# Patient Record
Sex: MALE | Race: BLACK OR AFRICAN AMERICAN | NOT HISPANIC OR LATINO | URBAN - METROPOLITAN AREA
[De-identification: names, ages, dates, MRNs, and addresses within clinical notes are randomized per-mention and may not be internally consistent; named-entity substitution may affect disease eponyms.]

---

## 2019-01-01 ENCOUNTER — INPATIENT (INPATIENT)
Facility: HOSPITAL | Age: 74
LOS: 2 days | Discharge: HOME HEALTH | End: 2019-12-31
Attending: HOSPITALIST | Admitting: HOSPITALIST
Payer: MEDICARE

## 2019-01-01 VITALS
DIASTOLIC BLOOD PRESSURE: 86 MMHG | RESPIRATION RATE: 18 BRPM | HEART RATE: 68 BPM | SYSTOLIC BLOOD PRESSURE: 133 MMHG | TEMPERATURE: 98 F | OXYGEN SATURATION: 96 %

## 2019-01-01 VITALS
RESPIRATION RATE: 24 BRPM | SYSTOLIC BLOOD PRESSURE: 125 MMHG | OXYGEN SATURATION: 100 % | DIASTOLIC BLOOD PRESSURE: 74 MMHG | HEART RATE: 76 BPM | TEMPERATURE: 99 F

## 2019-01-01 DIAGNOSIS — N17.9 ACUTE KIDNEY FAILURE, UNSPECIFIED: ICD-10-CM

## 2019-01-01 DIAGNOSIS — Z29.9 ENCOUNTER FOR PROPHYLACTIC MEASURES, UNSPECIFIED: ICD-10-CM

## 2019-01-01 DIAGNOSIS — E11.9 TYPE 2 DIABETES MELLITUS WITHOUT COMPLICATIONS: ICD-10-CM

## 2019-01-01 DIAGNOSIS — I10 ESSENTIAL (PRIMARY) HYPERTENSION: ICD-10-CM

## 2019-01-01 DIAGNOSIS — J44.9 CHRONIC OBSTRUCTIVE PULMONARY DISEASE, UNSPECIFIED: ICD-10-CM

## 2019-01-01 DIAGNOSIS — I25.10 ATHEROSCLEROTIC HEART DISEASE OF NATIVE CORONARY ARTERY WITHOUT ANGINA PECTORIS: ICD-10-CM

## 2019-01-01 DIAGNOSIS — N39.0 URINARY TRACT INFECTION, SITE NOT SPECIFIED: ICD-10-CM

## 2019-01-01 DIAGNOSIS — Z02.9 ENCOUNTER FOR ADMINISTRATIVE EXAMINATIONS, UNSPECIFIED: ICD-10-CM

## 2019-01-01 DIAGNOSIS — I50.21 ACUTE SYSTOLIC (CONGESTIVE) HEART FAILURE: ICD-10-CM

## 2019-01-01 DIAGNOSIS — J96.00 ACUTE RESPIRATORY FAILURE, UNSPECIFIED WHETHER WITH HYPOXIA OR HYPERCAPNIA: ICD-10-CM

## 2019-01-01 DIAGNOSIS — Z95.1 PRESENCE OF AORTOCORONARY BYPASS GRAFT: Chronic | ICD-10-CM

## 2019-01-01 LAB
ALBUMIN SERPL ELPH-MCNC: 3.9 G/DL — SIGNIFICANT CHANGE UP (ref 3.3–5)
ALBUMIN SERPL ELPH-MCNC: 3.9 G/DL — SIGNIFICANT CHANGE UP (ref 3.3–5)
ALP SERPL-CCNC: 103 U/L — SIGNIFICANT CHANGE UP (ref 40–120)
ALP SERPL-CCNC: 103 U/L — SIGNIFICANT CHANGE UP (ref 40–120)
ALT FLD-CCNC: 12 U/L — SIGNIFICANT CHANGE UP (ref 4–41)
ALT FLD-CCNC: 12 U/L — SIGNIFICANT CHANGE UP (ref 4–41)
ANION GAP SERPL CALC-SCNC: 12 MMO/L — SIGNIFICANT CHANGE UP (ref 7–14)
ANION GAP SERPL CALC-SCNC: 14 MMO/L — SIGNIFICANT CHANGE UP (ref 7–14)
ANION GAP SERPL CALC-SCNC: 14 MMO/L — SIGNIFICANT CHANGE UP (ref 7–14)
ANION GAP SERPL CALC-SCNC: 15 MMO/L — HIGH (ref 7–14)
APPEARANCE UR: SIGNIFICANT CHANGE UP
AST SERPL-CCNC: 11 U/L — SIGNIFICANT CHANGE UP (ref 4–40)
AST SERPL-CCNC: 11 U/L — SIGNIFICANT CHANGE UP (ref 4–40)
B PERT DNA SPEC QL NAA+PROBE: NOT DETECTED — SIGNIFICANT CHANGE UP
BACTERIA # UR AUTO: HIGH
BASE EXCESS BLDV CALC-SCNC: 3 MMOL/L — SIGNIFICANT CHANGE UP
BASOPHILS # BLD AUTO: 0.01 K/UL — SIGNIFICANT CHANGE UP (ref 0–0.2)
BASOPHILS # BLD AUTO: 0.01 K/UL — SIGNIFICANT CHANGE UP (ref 0–0.2)
BASOPHILS # BLD AUTO: 0.03 K/UL — SIGNIFICANT CHANGE UP (ref 0–0.2)
BASOPHILS # BLD AUTO: 0.04 K/UL — SIGNIFICANT CHANGE UP (ref 0–0.2)
BASOPHILS NFR BLD AUTO: 0.1 % — SIGNIFICANT CHANGE UP (ref 0–2)
BASOPHILS NFR BLD AUTO: 0.1 % — SIGNIFICANT CHANGE UP (ref 0–2)
BASOPHILS NFR BLD AUTO: 0.3 % — SIGNIFICANT CHANGE UP (ref 0–2)
BASOPHILS NFR BLD AUTO: 0.3 % — SIGNIFICANT CHANGE UP (ref 0–2)
BILIRUB SERPL-MCNC: 0.5 MG/DL — SIGNIFICANT CHANGE UP (ref 0.2–1.2)
BILIRUB SERPL-MCNC: 0.5 MG/DL — SIGNIFICANT CHANGE UP (ref 0.2–1.2)
BILIRUB UR-MCNC: NEGATIVE — SIGNIFICANT CHANGE UP
BLOOD GAS VENOUS - CREATININE: 2.66 MG/DL — HIGH (ref 0.5–1.3)
BLOOD GAS VENOUS - FIO2: 21 — SIGNIFICANT CHANGE UP
BLOOD UR QL VISUAL: HIGH
BUN SERPL-MCNC: 27 MG/DL — HIGH (ref 7–23)
BUN SERPL-MCNC: 30 MG/DL — HIGH (ref 7–23)
BUN SERPL-MCNC: 40 MG/DL — HIGH (ref 7–23)
BUN SERPL-MCNC: 45 MG/DL — HIGH (ref 7–23)
C PNEUM DNA SPEC QL NAA+PROBE: NOT DETECTED — SIGNIFICANT CHANGE UP
CALCIUM SERPL-MCNC: 10.1 MG/DL — SIGNIFICANT CHANGE UP (ref 8.4–10.5)
CALCIUM SERPL-MCNC: 10.1 MG/DL — SIGNIFICANT CHANGE UP (ref 8.4–10.5)
CALCIUM SERPL-MCNC: 9.3 MG/DL — SIGNIFICANT CHANGE UP (ref 8.4–10.5)
CALCIUM SERPL-MCNC: 9.9 MG/DL — SIGNIFICANT CHANGE UP (ref 8.4–10.5)
CHLORIDE BLDV-SCNC: 106 MMOL/L — SIGNIFICANT CHANGE UP (ref 96–108)
CHLORIDE SERPL-SCNC: 100 MMOL/L — SIGNIFICANT CHANGE UP (ref 98–107)
CHLORIDE SERPL-SCNC: 102 MMOL/L — SIGNIFICANT CHANGE UP (ref 98–107)
CHLORIDE UR-SCNC: 58 MMOL/L — SIGNIFICANT CHANGE UP
CHLORIDE UR-SCNC: < 20 MMOL/L — SIGNIFICANT CHANGE UP
CK MB BLD-MCNC: < 1 NG/ML — LOW (ref 1–6.6)
CK MB BLD-MCNC: SIGNIFICANT CHANGE UP (ref 0–2.5)
CK SERPL-CCNC: 74 U/L — SIGNIFICANT CHANGE UP (ref 30–200)
CO2 SERPL-SCNC: 21 MMOL/L — LOW (ref 22–31)
CO2 SERPL-SCNC: 22 MMOL/L — SIGNIFICANT CHANGE UP (ref 22–31)
CO2 SERPL-SCNC: 22 MMOL/L — SIGNIFICANT CHANGE UP (ref 22–31)
CO2 SERPL-SCNC: 23 MMOL/L — SIGNIFICANT CHANGE UP (ref 22–31)
COLOR SPEC: YELLOW — SIGNIFICANT CHANGE UP
CREAT ?TM UR-MCNC: 63 MG/DL — SIGNIFICANT CHANGE UP
CREAT SERPL-MCNC: 2.43 MG/DL — HIGH (ref 0.5–1.3)
CREAT SERPL-MCNC: 2.68 MG/DL — HIGH (ref 0.5–1.3)
CREAT SERPL-MCNC: 2.75 MG/DL — HIGH (ref 0.5–1.3)
CREAT SERPL-MCNC: 2.97 MG/DL — HIGH (ref 0.5–1.3)
EOSINOPHIL # BLD AUTO: 0 K/UL — SIGNIFICANT CHANGE UP (ref 0–0.5)
EOSINOPHIL # BLD AUTO: 0 K/UL — SIGNIFICANT CHANGE UP (ref 0–0.5)
EOSINOPHIL # BLD AUTO: 0.1 K/UL — SIGNIFICANT CHANGE UP (ref 0–0.5)
EOSINOPHIL # BLD AUTO: 0.14 K/UL — SIGNIFICANT CHANGE UP (ref 0–0.5)
EOSINOPHIL NFR BLD AUTO: 0 % — SIGNIFICANT CHANGE UP (ref 0–6)
EOSINOPHIL NFR BLD AUTO: 0 % — SIGNIFICANT CHANGE UP (ref 0–6)
EOSINOPHIL NFR BLD AUTO: 0.9 % — SIGNIFICANT CHANGE UP (ref 0–6)
EOSINOPHIL NFR BLD AUTO: 1.5 % — SIGNIFICANT CHANGE UP (ref 0–6)
FLUAV H1 2009 PAND RNA SPEC QL NAA+PROBE: NOT DETECTED — SIGNIFICANT CHANGE UP
FLUAV H1 RNA SPEC QL NAA+PROBE: NOT DETECTED — SIGNIFICANT CHANGE UP
FLUAV H3 RNA SPEC QL NAA+PROBE: NOT DETECTED — SIGNIFICANT CHANGE UP
FLUAV SUBTYP SPEC NAA+PROBE: NOT DETECTED — SIGNIFICANT CHANGE UP
FLUBV RNA SPEC QL NAA+PROBE: NOT DETECTED — SIGNIFICANT CHANGE UP
GAS PNL BLDV: 138 MMOL/L — SIGNIFICANT CHANGE UP (ref 136–146)
GLUCOSE BLDC GLUCOMTR-MCNC: 127 MG/DL — HIGH (ref 70–99)
GLUCOSE BLDC GLUCOMTR-MCNC: 142 MG/DL — HIGH (ref 70–99)
GLUCOSE BLDC GLUCOMTR-MCNC: 166 MG/DL — HIGH (ref 70–99)
GLUCOSE BLDC GLUCOMTR-MCNC: 174 MG/DL — HIGH (ref 70–99)
GLUCOSE BLDC GLUCOMTR-MCNC: 97 MG/DL — SIGNIFICANT CHANGE UP (ref 70–99)
GLUCOSE BLDV-MCNC: 143 MG/DL — HIGH (ref 70–99)
GLUCOSE SERPL-MCNC: 103 MG/DL — HIGH (ref 70–99)
GLUCOSE SERPL-MCNC: 122 MG/DL — HIGH (ref 70–99)
GLUCOSE SERPL-MCNC: 148 MG/DL — HIGH (ref 70–99)
GLUCOSE SERPL-MCNC: 178 MG/DL — HIGH (ref 70–99)
GLUCOSE UR-MCNC: NEGATIVE — SIGNIFICANT CHANGE UP
HADV DNA SPEC QL NAA+PROBE: NOT DETECTED — SIGNIFICANT CHANGE UP
HBA1C BLD-MCNC: 5.8 % — HIGH (ref 4–5.6)
HCO3 BLDV-SCNC: 25 MMOL/L — SIGNIFICANT CHANGE UP (ref 20–27)
HCOV PNL SPEC NAA+PROBE: SIGNIFICANT CHANGE UP
HCT VFR BLD CALC: 39.5 % — SIGNIFICANT CHANGE UP (ref 39–50)
HCT VFR BLD CALC: 40.4 % — SIGNIFICANT CHANGE UP (ref 39–50)
HCT VFR BLD CALC: 41.4 % — SIGNIFICANT CHANGE UP (ref 39–50)
HCT VFR BLD CALC: 42 % — SIGNIFICANT CHANGE UP (ref 39–50)
HCT VFR BLDV CALC: 45.2 % — SIGNIFICANT CHANGE UP (ref 39–51)
HCV AB S/CO SERPL IA: 0.22 S/CO — SIGNIFICANT CHANGE UP (ref 0–0.99)
HCV AB SERPL-IMP: SIGNIFICANT CHANGE UP
HGB BLD-MCNC: 12.9 G/DL — LOW (ref 13–17)
HGB BLD-MCNC: 13.3 G/DL — SIGNIFICANT CHANGE UP (ref 13–17)
HGB BLD-MCNC: 14.1 G/DL — SIGNIFICANT CHANGE UP (ref 13–17)
HGB BLD-MCNC: 14.2 G/DL — SIGNIFICANT CHANGE UP (ref 13–17)
HGB BLDV-MCNC: 14.7 G/DL — SIGNIFICANT CHANGE UP (ref 13–17)
HMPV RNA SPEC QL NAA+PROBE: NOT DETECTED — SIGNIFICANT CHANGE UP
HPIV1 RNA SPEC QL NAA+PROBE: NOT DETECTED — SIGNIFICANT CHANGE UP
HPIV2 RNA SPEC QL NAA+PROBE: NOT DETECTED — SIGNIFICANT CHANGE UP
HPIV3 RNA SPEC QL NAA+PROBE: NOT DETECTED — SIGNIFICANT CHANGE UP
HPIV4 RNA SPEC QL NAA+PROBE: NOT DETECTED — SIGNIFICANT CHANGE UP
IMM GRANULOCYTES NFR BLD AUTO: 0.3 % — SIGNIFICANT CHANGE UP (ref 0–1.5)
IMM GRANULOCYTES NFR BLD AUTO: 0.4 % — SIGNIFICANT CHANGE UP (ref 0–1.5)
KETONES UR-MCNC: NEGATIVE — SIGNIFICANT CHANGE UP
LACTATE BLDV-MCNC: 1.6 MMOL/L — SIGNIFICANT CHANGE UP (ref 0.5–2)
LEUKOCYTE ESTERASE UR-ACNC: SIGNIFICANT CHANGE UP
LYMPHOCYTES # BLD AUTO: 1.07 K/UL — SIGNIFICANT CHANGE UP (ref 1–3.3)
LYMPHOCYTES # BLD AUTO: 1.14 K/UL — SIGNIFICANT CHANGE UP (ref 1–3.3)
LYMPHOCYTES # BLD AUTO: 1.55 K/UL — SIGNIFICANT CHANGE UP (ref 1–3.3)
LYMPHOCYTES # BLD AUTO: 12.1 % — LOW (ref 13–44)
LYMPHOCYTES # BLD AUTO: 2.57 K/UL — SIGNIFICANT CHANGE UP (ref 1–3.3)
LYMPHOCYTES # BLD AUTO: 28.1 % — SIGNIFICANT CHANGE UP (ref 13–44)
LYMPHOCYTES # BLD AUTO: 9.7 % — LOW (ref 13–44)
LYMPHOCYTES # BLD AUTO: 9.8 % — LOW (ref 13–44)
MAGNESIUM SERPL-MCNC: 2.1 MG/DL — SIGNIFICANT CHANGE UP (ref 1.6–2.6)
MAGNESIUM SERPL-MCNC: 2.2 MG/DL — SIGNIFICANT CHANGE UP (ref 1.6–2.6)
MAGNESIUM SERPL-MCNC: 2.2 MG/DL — SIGNIFICANT CHANGE UP (ref 1.6–2.6)
MCHC RBC-ENTMCNC: 29.9 PG — SIGNIFICANT CHANGE UP (ref 27–34)
MCHC RBC-ENTMCNC: 30.9 PG — SIGNIFICANT CHANGE UP (ref 27–34)
MCHC RBC-ENTMCNC: 31.1 PG — SIGNIFICANT CHANGE UP (ref 27–34)
MCHC RBC-ENTMCNC: 31.2 PG — SIGNIFICANT CHANGE UP (ref 27–34)
MCHC RBC-ENTMCNC: 32.7 % — SIGNIFICANT CHANGE UP (ref 32–36)
MCHC RBC-ENTMCNC: 32.9 % — SIGNIFICANT CHANGE UP (ref 32–36)
MCHC RBC-ENTMCNC: 33.8 % — SIGNIFICANT CHANGE UP (ref 32–36)
MCHC RBC-ENTMCNC: 34.1 % — SIGNIFICANT CHANGE UP (ref 32–36)
MCV RBC AUTO: 91.4 FL — SIGNIFICANT CHANGE UP (ref 80–100)
MCV RBC AUTO: 91.6 FL — SIGNIFICANT CHANGE UP (ref 80–100)
MCV RBC AUTO: 92.1 FL — SIGNIFICANT CHANGE UP (ref 80–100)
MCV RBC AUTO: 94 FL — SIGNIFICANT CHANGE UP (ref 80–100)
MONOCYTES # BLD AUTO: 0.19 K/UL — SIGNIFICANT CHANGE UP (ref 0–0.9)
MONOCYTES # BLD AUTO: 0.44 K/UL — SIGNIFICANT CHANGE UP (ref 0–0.9)
MONOCYTES # BLD AUTO: 0.72 K/UL — SIGNIFICANT CHANGE UP (ref 0–0.9)
MONOCYTES # BLD AUTO: 0.79 K/UL — SIGNIFICANT CHANGE UP (ref 0–0.9)
MONOCYTES NFR BLD AUTO: 1.7 % — LOW (ref 2–14)
MONOCYTES NFR BLD AUTO: 3.8 % — SIGNIFICANT CHANGE UP (ref 2–14)
MONOCYTES NFR BLD AUTO: 6.2 % — SIGNIFICANT CHANGE UP (ref 2–14)
MONOCYTES NFR BLD AUTO: 7.9 % — SIGNIFICANT CHANGE UP (ref 2–14)
MUCOUS THREADS # UR AUTO: SIGNIFICANT CHANGE UP
NEUTROPHILS # BLD AUTO: 10.44 K/UL — HIGH (ref 1.8–7.4)
NEUTROPHILS # BLD AUTO: 5.64 K/UL — SIGNIFICANT CHANGE UP (ref 1.8–7.4)
NEUTROPHILS # BLD AUTO: 9.71 K/UL — HIGH (ref 1.8–7.4)
NEUTROPHILS # BLD AUTO: 9.83 K/UL — HIGH (ref 1.8–7.4)
NEUTROPHILS NFR BLD AUTO: 61.8 % — SIGNIFICANT CHANGE UP (ref 43–77)
NEUTROPHILS NFR BLD AUTO: 81.2 % — HIGH (ref 43–77)
NEUTROPHILS NFR BLD AUTO: 84.9 % — HIGH (ref 43–77)
NEUTROPHILS NFR BLD AUTO: 88.1 % — HIGH (ref 43–77)
NITRITE UR-MCNC: NEGATIVE — SIGNIFICANT CHANGE UP
NRBC # FLD: 0 K/UL — SIGNIFICANT CHANGE UP (ref 0–0)
NT-PROBNP SERPL-SCNC: 127.5 PG/ML — SIGNIFICANT CHANGE UP
OSMOLALITY UR: 439 MOSMO/KG — SIGNIFICANT CHANGE UP (ref 50–1200)
PCO2 BLDV: 50 MMHG — SIGNIFICANT CHANGE UP (ref 41–51)
PH BLDV: 7.37 PH — SIGNIFICANT CHANGE UP (ref 7.32–7.43)
PH UR: 7 — SIGNIFICANT CHANGE UP (ref 5–8)
PHOSPHATE SERPL-MCNC: 1.1 MG/DL — LOW (ref 2.5–4.5)
PHOSPHATE SERPL-MCNC: 3.2 MG/DL — SIGNIFICANT CHANGE UP (ref 2.5–4.5)
PHOSPHATE SERPL-MCNC: 3.2 MG/DL — SIGNIFICANT CHANGE UP (ref 2.5–4.5)
PLATELET # BLD AUTO: 236 K/UL — SIGNIFICANT CHANGE UP (ref 150–400)
PLATELET # BLD AUTO: 259 K/UL — SIGNIFICANT CHANGE UP (ref 150–400)
PLATELET # BLD AUTO: 264 K/UL — SIGNIFICANT CHANGE UP (ref 150–400)
PLATELET # BLD AUTO: 287 K/UL — SIGNIFICANT CHANGE UP (ref 150–400)
PMV BLD: 10.7 FL — SIGNIFICANT CHANGE UP (ref 7–13)
PMV BLD: 10.8 FL — SIGNIFICANT CHANGE UP (ref 7–13)
PMV BLD: 11.2 FL — SIGNIFICANT CHANGE UP (ref 7–13)
PMV BLD: 9.9 FL — SIGNIFICANT CHANGE UP (ref 7–13)
PO2 BLDV: 29 MMHG — LOW (ref 35–40)
POTASSIUM BLDV-SCNC: 3.5 MMOL/L — SIGNIFICANT CHANGE UP (ref 3.4–4.5)
POTASSIUM SERPL-MCNC: 3.2 MMOL/L — LOW (ref 3.5–5.3)
POTASSIUM SERPL-MCNC: 3.4 MMOL/L — LOW (ref 3.5–5.3)
POTASSIUM SERPL-MCNC: 3.4 MMOL/L — LOW (ref 3.5–5.3)
POTASSIUM SERPL-MCNC: 3.8 MMOL/L — SIGNIFICANT CHANGE UP (ref 3.5–5.3)
POTASSIUM SERPL-SCNC: 3.2 MMOL/L — LOW (ref 3.5–5.3)
POTASSIUM SERPL-SCNC: 3.4 MMOL/L — LOW (ref 3.5–5.3)
POTASSIUM SERPL-SCNC: 3.4 MMOL/L — LOW (ref 3.5–5.3)
POTASSIUM SERPL-SCNC: 3.8 MMOL/L — SIGNIFICANT CHANGE UP (ref 3.5–5.3)
POTASSIUM UR-SCNC: 12 MMOL/L — SIGNIFICANT CHANGE UP
POTASSIUM UR-SCNC: 66.9 MMOL/L — SIGNIFICANT CHANGE UP
PROT SERPL-MCNC: 8 G/DL — SIGNIFICANT CHANGE UP (ref 6–8.3)
PROT SERPL-MCNC: 8.2 G/DL — SIGNIFICANT CHANGE UP (ref 6–8.3)
PROT UR-MCNC: 50 — SIGNIFICANT CHANGE UP
RBC # BLD: 4.3 M/UL — SIGNIFICANT CHANGE UP (ref 4.2–5.8)
RBC # BLD: 4.32 M/UL — SIGNIFICANT CHANGE UP (ref 4.2–5.8)
RBC # BLD: 4.52 M/UL — SIGNIFICANT CHANGE UP (ref 4.2–5.8)
RBC # BLD: 4.56 M/UL — SIGNIFICANT CHANGE UP (ref 4.2–5.8)
RBC # FLD: 16.5 % — HIGH (ref 10.3–14.5)
RBC # FLD: 16.5 % — HIGH (ref 10.3–14.5)
RBC # FLD: 16.8 % — HIGH (ref 10.3–14.5)
RBC # FLD: 17.1 % — HIGH (ref 10.3–14.5)
RBC CASTS # UR COMP ASSIST: SIGNIFICANT CHANGE UP (ref 0–?)
RSV RNA SPEC QL NAA+PROBE: NOT DETECTED — SIGNIFICANT CHANGE UP
RV+EV RNA SPEC QL NAA+PROBE: NOT DETECTED — SIGNIFICANT CHANGE UP
SAO2 % BLDV: 49.2 % — LOW (ref 60–85)
SODIUM SERPL-SCNC: 135 MMOL/L — SIGNIFICANT CHANGE UP (ref 135–145)
SODIUM SERPL-SCNC: 137 MMOL/L — SIGNIFICANT CHANGE UP (ref 135–145)
SODIUM SERPL-SCNC: 138 MMOL/L — SIGNIFICANT CHANGE UP (ref 135–145)
SODIUM SERPL-SCNC: 139 MMOL/L — SIGNIFICANT CHANGE UP (ref 135–145)
SODIUM UR-SCNC: 85 MMOL/L — SIGNIFICANT CHANGE UP
SODIUM UR-SCNC: SIGNIFICANT CHANGE UP MMOL/L
SP GR SPEC: 1.02 — SIGNIFICANT CHANGE UP (ref 1–1.04)
TROPONIN T, HIGH SENSITIVITY: 15 NG/L — SIGNIFICANT CHANGE UP (ref ?–14)
TROPONIN T, HIGH SENSITIVITY: 19 NG/L — SIGNIFICANT CHANGE UP (ref ?–14)
UROBILINOGEN FLD QL: NORMAL — SIGNIFICANT CHANGE UP
UUN UR-MCNC: 579.2 MG/DL — SIGNIFICANT CHANGE UP
UUN UR-MCNC: 607 MG/DL — SIGNIFICANT CHANGE UP
WBC # BLD: 11.02 K/UL — HIGH (ref 3.8–10.5)
WBC # BLD: 11.59 K/UL — HIGH (ref 3.8–10.5)
WBC # BLD: 12.84 K/UL — HIGH (ref 3.8–10.5)
WBC # BLD: 9.14 K/UL — SIGNIFICANT CHANGE UP (ref 3.8–10.5)
WBC # FLD AUTO: 11.02 K/UL — HIGH (ref 3.8–10.5)
WBC # FLD AUTO: 11.59 K/UL — HIGH (ref 3.8–10.5)
WBC # FLD AUTO: 12.84 K/UL — HIGH (ref 3.8–10.5)
WBC # FLD AUTO: 9.14 K/UL — SIGNIFICANT CHANGE UP (ref 3.8–10.5)
WBC UR QL: SIGNIFICANT CHANGE UP (ref 0–?)

## 2019-01-01 PROCEDURE — 99232 SBSQ HOSP IP/OBS MODERATE 35: CPT | Mod: GC

## 2019-01-01 PROCEDURE — 99233 SBSQ HOSP IP/OBS HIGH 50: CPT

## 2019-01-01 PROCEDURE — 99223 1ST HOSP IP/OBS HIGH 75: CPT

## 2019-01-01 PROCEDURE — 71250 CT THORAX DX C-: CPT | Mod: 26

## 2019-01-01 PROCEDURE — 71045 X-RAY EXAM CHEST 1 VIEW: CPT | Mod: 26

## 2019-01-01 PROCEDURE — 76770 US EXAM ABDO BACK WALL COMP: CPT | Mod: 26

## 2019-01-01 PROCEDURE — 99239 HOSP IP/OBS DSCHRG MGMT >30: CPT

## 2019-01-01 RX ORDER — NIFEDIPINE 30 MG
1 TABLET, EXTENDED RELEASE 24 HR ORAL
Qty: 0 | Refills: 0 | DISCHARGE

## 2019-01-01 RX ORDER — RANOLAZINE 500 MG/1
1000 TABLET, FILM COATED, EXTENDED RELEASE ORAL
Refills: 0 | Status: DISCONTINUED | OUTPATIENT
Start: 2019-01-01 | End: 2019-01-01

## 2019-01-01 RX ORDER — RANOLAZINE 500 MG/1
1 TABLET, FILM COATED, EXTENDED RELEASE ORAL
Qty: 0 | Refills: 0 | DISCHARGE

## 2019-01-01 RX ORDER — CEFTRIAXONE 500 MG/1
1000 INJECTION, POWDER, FOR SOLUTION INTRAMUSCULAR; INTRAVENOUS EVERY 24 HOURS
Refills: 0 | Status: DISCONTINUED | OUTPATIENT
Start: 2019-01-01 | End: 2019-01-01

## 2019-01-01 RX ORDER — HEPARIN SODIUM 5000 [USP'U]/ML
5000 INJECTION INTRAVENOUS; SUBCUTANEOUS EVERY 8 HOURS
Refills: 0 | Status: DISCONTINUED | OUTPATIENT
Start: 2019-01-01 | End: 2019-01-01

## 2019-01-01 RX ORDER — BUDESONIDE AND FORMOTEROL FUMARATE DIHYDRATE 160; 4.5 UG/1; UG/1
2 AEROSOL RESPIRATORY (INHALATION)
Qty: 0 | Refills: 0 | DISCHARGE

## 2019-01-01 RX ORDER — AZITHROMYCIN 500 MG/1
500 TABLET, FILM COATED ORAL ONCE
Refills: 0 | Status: COMPLETED | OUTPATIENT
Start: 2019-01-01 | End: 2019-01-01

## 2019-01-01 RX ORDER — BUMETANIDE 0.25 MG/ML
1 INJECTION INTRAMUSCULAR; INTRAVENOUS
Qty: 0 | Refills: 0 | DISCHARGE

## 2019-01-01 RX ORDER — DEXTROSE 50 % IN WATER 50 %
12.5 SYRINGE (ML) INTRAVENOUS ONCE
Refills: 0 | Status: DISCONTINUED | OUTPATIENT
Start: 2019-01-01 | End: 2019-01-01

## 2019-01-01 RX ORDER — IPRATROPIUM/ALBUTEROL SULFATE 18-103MCG
3 AEROSOL WITH ADAPTER (GRAM) INHALATION ONCE
Refills: 0 | Status: COMPLETED | OUTPATIENT
Start: 2019-01-01 | End: 2019-01-01

## 2019-01-01 RX ORDER — GLUCAGON INJECTION, SOLUTION 0.5 MG/.1ML
1 INJECTION, SOLUTION SUBCUTANEOUS ONCE
Refills: 0 | Status: DISCONTINUED | OUTPATIENT
Start: 2019-01-01 | End: 2019-01-01

## 2019-01-01 RX ORDER — ATORVASTATIN CALCIUM 80 MG/1
80 TABLET, FILM COATED ORAL AT BEDTIME
Refills: 0 | Status: DISCONTINUED | OUTPATIENT
Start: 2019-01-01 | End: 2019-01-01

## 2019-01-01 RX ORDER — INSULIN LISPRO 100/ML
VIAL (ML) SUBCUTANEOUS AT BEDTIME
Refills: 0 | Status: DISCONTINUED | OUTPATIENT
Start: 2019-01-01 | End: 2019-01-01

## 2019-01-01 RX ORDER — HYDRALAZINE HCL 50 MG
1 TABLET ORAL
Qty: 0 | Refills: 0 | DISCHARGE

## 2019-01-01 RX ORDER — SODIUM CHLORIDE 9 MG/ML
1000 INJECTION INTRAMUSCULAR; INTRAVENOUS; SUBCUTANEOUS
Refills: 0 | Status: DISCONTINUED | OUTPATIENT
Start: 2019-01-01 | End: 2019-01-01

## 2019-01-01 RX ORDER — IPRATROPIUM/ALBUTEROL SULFATE 18-103MCG
3 AEROSOL WITH ADAPTER (GRAM) INHALATION EVERY 6 HOURS
Refills: 0 | Status: DISCONTINUED | OUTPATIENT
Start: 2019-01-01 | End: 2019-01-01

## 2019-01-01 RX ORDER — SODIUM CHLORIDE 9 MG/ML
1000 INJECTION, SOLUTION INTRAVENOUS
Refills: 0 | Status: DISCONTINUED | OUTPATIENT
Start: 2019-01-01 | End: 2019-01-01

## 2019-01-01 RX ORDER — CARVEDILOL PHOSPHATE 80 MG/1
25 CAPSULE, EXTENDED RELEASE ORAL EVERY 12 HOURS
Refills: 0 | Status: DISCONTINUED | OUTPATIENT
Start: 2019-01-01 | End: 2019-01-01

## 2019-01-01 RX ORDER — DEXTROSE 50 % IN WATER 50 %
25 SYRINGE (ML) INTRAVENOUS ONCE
Refills: 0 | Status: DISCONTINUED | OUTPATIENT
Start: 2019-01-01 | End: 2019-01-01

## 2019-01-01 RX ORDER — SITAGLIPTIN 50 MG/1
1 TABLET, FILM COATED ORAL
Qty: 0 | Refills: 0 | DISCHARGE

## 2019-01-01 RX ORDER — CARVEDILOL PHOSPHATE 80 MG/1
1 CAPSULE, EXTENDED RELEASE ORAL
Qty: 0 | Refills: 0 | DISCHARGE

## 2019-01-01 RX ORDER — INSULIN LISPRO 100/ML
VIAL (ML) SUBCUTANEOUS
Refills: 0 | Status: DISCONTINUED | OUTPATIENT
Start: 2019-01-01 | End: 2019-01-01

## 2019-01-01 RX ORDER — ATORVASTATIN CALCIUM 80 MG/1
1 TABLET, FILM COATED ORAL
Qty: 0 | Refills: 0 | DISCHARGE

## 2019-01-01 RX ORDER — NIFEDIPINE 30 MG
60 TABLET, EXTENDED RELEASE 24 HR ORAL DAILY
Refills: 0 | Status: DISCONTINUED | OUTPATIENT
Start: 2019-01-01 | End: 2019-01-01

## 2019-01-01 RX ORDER — DEXTROSE 50 % IN WATER 50 %
15 SYRINGE (ML) INTRAVENOUS ONCE
Refills: 0 | Status: DISCONTINUED | OUTPATIENT
Start: 2019-01-01 | End: 2019-01-01

## 2019-01-01 RX ORDER — SENNA PLUS 8.6 MG/1
1 TABLET ORAL
Qty: 0 | Refills: 0 | DISCHARGE

## 2019-01-01 RX ORDER — POTASSIUM PHOSPHATE, MONOBASIC POTASSIUM PHOSPHATE, DIBASIC 236; 224 MG/ML; MG/ML
15 INJECTION, SOLUTION INTRAVENOUS ONCE
Refills: 0 | Status: COMPLETED | OUTPATIENT
Start: 2019-01-01 | End: 2019-01-01

## 2019-01-01 RX ADMIN — AZITHROMYCIN 500 MILLIGRAM(S): 500 TABLET, FILM COATED ORAL at 21:20

## 2019-01-01 RX ADMIN — CARVEDILOL PHOSPHATE 25 MILLIGRAM(S): 80 CAPSULE, EXTENDED RELEASE ORAL at 05:03

## 2019-01-01 RX ADMIN — CEFTRIAXONE 100 MILLIGRAM(S): 500 INJECTION, POWDER, FOR SOLUTION INTRAMUSCULAR; INTRAVENOUS at 14:16

## 2019-01-01 RX ADMIN — HEPARIN SODIUM 5000 UNIT(S): 5000 INJECTION INTRAVENOUS; SUBCUTANEOUS at 14:12

## 2019-01-01 RX ADMIN — CARVEDILOL PHOSPHATE 25 MILLIGRAM(S): 80 CAPSULE, EXTENDED RELEASE ORAL at 17:37

## 2019-01-01 RX ADMIN — HEPARIN SODIUM 5000 UNIT(S): 5000 INJECTION INTRAVENOUS; SUBCUTANEOUS at 21:54

## 2019-01-01 RX ADMIN — Medication 3 MILLILITER(S): at 21:50

## 2019-01-01 RX ADMIN — Medication 3 MILLILITER(S): at 15:40

## 2019-01-01 RX ADMIN — Medication 3 MILLILITER(S): at 09:37

## 2019-01-01 RX ADMIN — Medication 1: at 17:37

## 2019-01-01 RX ADMIN — Medication 60 MILLIGRAM(S): at 05:03

## 2019-01-01 RX ADMIN — CEFTRIAXONE 100 MILLIGRAM(S): 500 INJECTION, POWDER, FOR SOLUTION INTRAMUSCULAR; INTRAVENOUS at 13:35

## 2019-01-01 RX ADMIN — Medication 3 MILLILITER(S): at 00:20

## 2019-01-01 RX ADMIN — HEPARIN SODIUM 5000 UNIT(S): 5000 INJECTION INTRAVENOUS; SUBCUTANEOUS at 05:26

## 2019-01-01 RX ADMIN — RANOLAZINE 1000 MILLIGRAM(S): 500 TABLET, FILM COATED, EXTENDED RELEASE ORAL at 05:22

## 2019-01-01 RX ADMIN — RANOLAZINE 1000 MILLIGRAM(S): 500 TABLET, FILM COATED, EXTENDED RELEASE ORAL at 17:25

## 2019-01-01 RX ADMIN — Medication 60 MILLIGRAM(S): at 05:21

## 2019-01-01 RX ADMIN — ATORVASTATIN CALCIUM 80 MILLIGRAM(S): 80 TABLET, FILM COATED ORAL at 21:54

## 2019-01-01 RX ADMIN — HEPARIN SODIUM 5000 UNIT(S): 5000 INJECTION INTRAVENOUS; SUBCUTANEOUS at 13:19

## 2019-01-01 RX ADMIN — Medication 3 MILLILITER(S): at 16:15

## 2019-01-01 RX ADMIN — ATORVASTATIN CALCIUM 80 MILLIGRAM(S): 80 TABLET, FILM COATED ORAL at 22:15

## 2019-01-01 RX ADMIN — CARVEDILOL PHOSPHATE 25 MILLIGRAM(S): 80 CAPSULE, EXTENDED RELEASE ORAL at 17:25

## 2019-01-01 RX ADMIN — Medication 3 MILLILITER(S): at 10:00

## 2019-01-01 RX ADMIN — HEPARIN SODIUM 5000 UNIT(S): 5000 INJECTION INTRAVENOUS; SUBCUTANEOUS at 05:22

## 2019-01-01 RX ADMIN — CARVEDILOL PHOSPHATE 25 MILLIGRAM(S): 80 CAPSULE, EXTENDED RELEASE ORAL at 05:22

## 2019-01-01 RX ADMIN — HEPARIN SODIUM 5000 UNIT(S): 5000 INJECTION INTRAVENOUS; SUBCUTANEOUS at 22:15

## 2019-01-01 RX ADMIN — SODIUM CHLORIDE 100 MILLILITER(S): 9 INJECTION INTRAMUSCULAR; INTRAVENOUS; SUBCUTANEOUS at 10:08

## 2019-01-01 RX ADMIN — Medication 1: at 12:12

## 2019-01-01 RX ADMIN — Medication 3 MILLILITER(S): at 20:24

## 2019-01-01 RX ADMIN — RANOLAZINE 1000 MILLIGRAM(S): 500 TABLET, FILM COATED, EXTENDED RELEASE ORAL at 05:03

## 2019-01-01 RX ADMIN — SODIUM CHLORIDE 100 MILLILITER(S): 9 INJECTION INTRAMUSCULAR; INTRAVENOUS; SUBCUTANEOUS at 17:25

## 2019-01-01 RX ADMIN — Medication 3 MILLILITER(S): at 10:47

## 2019-01-01 RX ADMIN — Medication 3 MILLILITER(S): at 05:00

## 2019-01-01 RX ADMIN — AZITHROMYCIN 255 MILLIGRAM(S): 500 TABLET, FILM COATED ORAL at 20:24

## 2019-01-01 RX ADMIN — POTASSIUM PHOSPHATE, MONOBASIC POTASSIUM PHOSPHATE, DIBASIC 62.5 MILLIMOLE(S): 236; 224 INJECTION, SOLUTION INTRAVENOUS at 09:51

## 2019-01-01 RX ADMIN — HEPARIN SODIUM 5000 UNIT(S): 5000 INJECTION INTRAVENOUS; SUBCUTANEOUS at 05:03

## 2019-12-28 NOTE — ED PROVIDER NOTE - ATTENDING CONTRIBUTION TO CARE
Attending note:   After face to face evaluation of this patient, I concur with above noted hx, pe, and care plan for this patient.  73 y/o M with dm, htn, copd wheezing today with treatment by ems with two duonebs and decadron, here more comfortable, some wheeze present, no rales.   +Flu vaccinated; no fever.

## 2019-12-28 NOTE — ED PROVIDER NOTE - CLINICAL SUMMARY MEDICAL DECISION MAKING FREE TEXT BOX
74M w/ COPD exacerbation, received treatment via EMS, will check labs, cxr, vbg, azithromycin, treatment, reassess

## 2019-12-28 NOTE — ED PROVIDER NOTE - PHYSICAL EXAMINATION
CONSTITUTIONAL: NAD, awake, alert  HEAD: Normocephalic; atraumatic  ENMT: External appears normal, MMM  CARD: Normal Sl, S2; no audible murmurs  RESP: normal wob, wheezes throughout, sating 94%  ABD: soft, non-distended; non-tender  MSK: no edema, normal ROM in all four extremities  SKIN: Warm, dry, no rashes  NEURO: aaox3, moving all extremities spontaneously

## 2019-12-28 NOTE — ED PROVIDER NOTE - OBJECTIVE STATEMENT
74M w/ COPD, HTN, DM, HLD p/w 1 day of SOB. Daughter at bedside states patient has multiple sick viral contacts at home. Patient denies fevers, chills, n/v. States he has been having some chest pain for a long time with walking. No chest pain today. Reports normal echo and stress test 2 weeks ago. EMS provided patient w/ 12mg of dex, 2 treatments. Daughter states patient has poor f/u and requesting referrals

## 2019-12-28 NOTE — ED PROVIDER NOTE - PROGRESS NOTE DETAILS
Ramirez: discussed w/ daughter about renal function results, states they do not know of any h/o renal disease, states patient hasn't had labs in some time, will admit for new onset jayson vs ckd Ramirez: bedside sono reveals small bladder, pro BNP negative

## 2019-12-28 NOTE — ED PROVIDER NOTE - NS ED ROS FT
General: denies fever, chills  HENT:  + nasal congestion, + rhinorrhea  Eyes: denies visual changes, blurred vision  CV: + chest pain, denies palpitations  Resp: + difficulty breathing, denies cough  Abdominal: denies nausea, vomiting, abdominal pain  MSK: denies muscle aches, leg swelling  Neuro: denies headaches, numbness, tingling  Skin: denies rashes, bruises

## 2019-12-28 NOTE — ED ADULT NURSE REASSESSMENT NOTE - NS ED NURSE REASSESS COMMENT FT1
Patient resting comfortably, reports feeling overall symptomatic improvement while in ED.  Denies shortness of breath, chest pain, or other complaints at this time.  Vitally stable at this time. Evening Blood glucose checked.  Patient and family advised to plan of care, RVP resulted as negative, isolation precautions discontinued, pending report given to admission bed assignment.  Patient accepting to plan of care.

## 2019-12-28 NOTE — ED PROVIDER NOTE - CARE PLAN
Principal Discharge DX:	JOHN (acute kidney injury)  Secondary Diagnosis:	COPD (chronic obstructive pulmonary disease)

## 2019-12-28 NOTE — ED ADULT TRIAGE NOTE - CHIEF COMPLAINT QUOTE
Pt SOB and cough today, audible wheezing noted.  int he field. Combivent treatment x 2  and IV 12mg decadron 12Mg given by EMS via20G left hand PMH copd, CVA, DM, htn

## 2019-12-28 NOTE — ED ADULT NURSE NOTE - OBJECTIVE STATEMENT
Receive pt. in room 19 alert and oriented x 4 presenting to the ER with complaints of cough, wheezing and shortness of breath. Pt. have a history of Asthma, DM, CVA, HTN, COPD. Pt.tated " this afternoon I have a cough and I started wheezing and sometimes I have shprtness of breath. Medicated as ordered, labs sent .

## 2019-12-28 NOTE — ED ADULT NURSE REASSESSMENT NOTE - NS ED NURSE REASSESS COMMENT FT1
Received pt. from pranav RN. RVP sent. Pt. and family at bedside given education regarding droplet precautions. Pt. placed on pulse ox, pt. oxygen saturation at 96%. Pt. denies any pain. Awaiting further orders. Will continue to monitor.

## 2019-12-29 NOTE — H&P ADULT - PROBLEM SELECTOR PLAN 1
- Patient p/w resp distress. S/p steroids and Duonebs. Now with significant symptomatic improvement.  - Unclear if GALEANO is 2/2 COPD exacerbation vs. PNA vs. CHF exacerbation  - Will continue to monitor sx. Will trend VBG if worsening status.

## 2019-12-29 NOTE — H&P ADULT - NSICDXPASTMEDICALHX_GEN_ALL_CORE_FT
PAST MEDICAL HISTORY:  COPD (chronic obstructive pulmonary disease)     DM (diabetes mellitus)     HLD (hyperlipidemia)     HTN (hypertension)

## 2019-12-29 NOTE — H&P ADULT - NSHPLABSRESULTS_GEN_ALL_CORE
(12-28 @ 20:15)                      14.1  11.59 )-----------( 236                 41.4    Neutrophils = 9.83 (84.9%)  Lymphocytes = 1.14 (9.8%)  Eosinophils = 0.10 (0.9%)  Basophils = 0.04 (0.3%)  Monocytes = 0.44 (3.8%)  Bands = --%    12-28    139  |  102  |  27<H>  ----------------------------<  148<H>  3.8   |  22  |  2.68<H>    Ca    10.1      28 Dec 2019 20:15    TPro  8.0  /  Alb  3.9  /  TBili  0.5  /  DBili  x   /  AST  11  /  ALT  12  /  AlkPhos  103  12-28    CARDIAC MARKERS ( 28 Dec 2019 20:15 )  Trop x     / CK 74 u/L / CKMB < 1.00 ng/mL<L>     Venous Blood Gas:  12-28 @ 20:15  7.37/50/29/25/49.2  VBG Lactate: 1.6    EKG: no acute ST changes

## 2019-12-29 NOTE — H&P ADULT - NSHPREVIEWOFSYSTEMS_GEN_ALL_CORE
CONSTITUTIONAL: weakness, no fevers or chills  EYES/ENT: No visual changes;  No vertigo or throat pain   NECK: No pain or stiffness  RESPIRATORY: No cough, wheezing, hemoptysis; SOB, GALEANO  CARDIOVASCULAR: No chest pain or palpitations  GASTROINTESTINAL: No abdominal or epigastric pain. No nausea, vomiting, or hematemesis; No diarrhea or constipation. No melena or hematochezia.  GENITOURINARY: No dysuria, frequency or hematuria  NEUROLOGICAL: No numbness or weakness  SKIN: No itching, burning, rashes, or lesions   PSYCH: No Depression, no anxiety  All other review of systems is negative unless indicated above.

## 2019-12-29 NOTE — PROGRESS NOTE ADULT - PROBLEM SELECTOR PLAN 4
- Unclear JOHN vs. CKD. Will obtain previous record from primary in the am.  - Trend Cr.  - Avoid nephrotoxins. - Unclear JOHN vs. CKD. Will obtain previous record from primary in the am.  Renal ultrasound no evidence of CKD.   Bladder scan 300cc+ residual volume.     Plan:   - Trend Cr.  - straight cath x1   - strict Is and Os  - F/u urine electrolytes, urine urea nitrogen, serum osmolality, urine osmolality   - Avoid nephrotoxins.

## 2019-12-29 NOTE — PHYSICAL THERAPY INITIAL EVALUATION ADULT - PERTINENT HX OF CURRENT PROBLEM, REHAB EVAL
Patient is 74 year old male admitted with history of COPD, DM2, HLD, CAD, presents with shortness of breath.

## 2019-12-29 NOTE — PROGRESS NOTE ADULT - PROBLEM SELECTOR PLAN 1
- Patient p/w resp distress. S/p steroids and Duonebs. Now with significant symptomatic improvement.  - Unclear if GALEANO is 2/2 COPD exacerbation vs. PNA vs. CHF exacerbation  - Will continue to monitor sx. Will trend VBG if worsening status. - Patient p/w resp distress. S/p steroids and Duonebs. Now with significant symptomatic improvement.  - Limited past medical history as patient receives all his care previously in [place].   - Unclear if the exacerbation is 2/2 COPD vs. heart failure vs. infectious etiology (pneumonia)     Plan:   - RVP negative  - DuoNeb q6h   - observe off antibiotics  - hold steroid for now

## 2019-12-29 NOTE — PROGRESS NOTE ADULT - PROBLEM SELECTOR PLAN 5
- Hold po agents.  - SSI  - Monitor FSG QID A1c 5.8, does not meet criteria for DM.   Plan:   - Hold po agents.  - Monitor FSG TID with meals/qHS

## 2019-12-29 NOTE — H&P ADULT - PROBLEM SELECTOR PLAN 9
1.  Name of PCP:  2.  PCP Contacted on Admission: [ ] Y    [x] N    3.  PCP contacted at Discharge: [ ] Y    [ ] N    [ ] N/A  4.  Post-Discharge Appointment Date and Location:  5.  Summary of Handoff given to PCP:

## 2019-12-29 NOTE — PROGRESS NOTE ADULT - PROBLEM SELECTOR PLAN 2
- C/w steroids for symptomatic relief.  - q6h Duonebs.  - Awaiting CT chest Limited PMH, as patient receives all his care previously in [place]. Medication reconciliation did not show any medications for COPD.   Plan:   - q6h Duonebs.  - F/u CT chest  - observe off antibiotics for now  - observe off steroids for now

## 2019-12-29 NOTE — PROGRESS NOTE ADULT - PROBLEM SELECTOR PLAN 3
- CXR with bilateral opacity. ? edema. Otherwise no signs for acute HF exacerbation: neg proBNP and no swelling.  - Will continue to monitor fluid status. Daily weight. Strict I&Os  - Repeat TTE.  - C/w patient's outpatient HF meds including coreg, bumex. - CXR with bilateral opacity. ? edema. Otherwise no signs for acute HF exacerbation: neg proBNP and no swelling.    Plan:   - Will continue to monitor fluid status. Daily weight. Strict I&Os  - Repeat TTE.  - C/w patient's outpatient HF meds including coreg, bumex.

## 2019-12-29 NOTE — H&P ADULT - PROBLEM SELECTOR PLAN 4
- Unclear JOHN vs. CKD. Will obtain previous record from primary in the am.  - Trend Cr.  - Avoid nephrotoxins.

## 2019-12-29 NOTE — H&P ADULT - PROBLEM SELECTOR PLAN 7
- No acute signs for ACS: neg trop, no acute EKG ST changes and no chest pain.  - Continue to monitor sx.  - C/w home regimen: ASA, BB and ranolazine.

## 2019-12-29 NOTE — H&P ADULT - ASSESSMENT
74M w/ COPD, HTN, DMII, HLD, chronic smoker, CAD s/p CABG p/w 1 day of SOB. Concerning for HF exacerbation vs PNA.

## 2019-12-29 NOTE — H&P ADULT - HISTORY OF PRESENT ILLNESS
74M w/ COPD, HTN, DMII, HLD, chronic smoker, CAD s/p CABG p/w 1 day of SOB. Daughter at bedside and provided history. Patient lately has multiple sick viral contacts at home. Patient became increasingly sob from 2 days ago. Patient denies fevers, chills, n/v, cough or sputum. Daughter reported witnessing patient got sob walking a few steps with audible wheezes. States he has been having some chest pain for a long time with walking. No chest pain today. Also had normal echo and stress test 2 weeks ago with his primary at [facility]. Otherwise, patient has no increasing lower extremity swelling. Continue to use 2 pillows at home without change.    En route, EMS provided patient w/ 12mg of dex, 2 treatments. In the ED, patient noted resp status improved after given duonebs. Also given azithro. CXR reveals see opacity for edema vs. infection.

## 2019-12-29 NOTE — PROGRESS NOTE ADULT - SUBJECTIVE AND OBJECTIVE BOX
Trish Berry   PGY-1 Resident Physician   Pager [phone]/ [phone] from 7am to 7pm, after 7pm page night float     Patient is a 74y old  Male who presents with a chief complaint of GALEANO (29 Dec 2019 01:16)      SUBJECTIVE / OVERNIGHT EVENTS:    MEDICATIONS  (STANDING):  albuterol/ipratropium for Nebulization 3 milliLiter(s) Nebulizer every 6 hours  atorvastatin 80 milliGRAM(s) Oral at bedtime  carvedilol 25 milliGRAM(s) Oral every 12 hours  dextrose 5%. 1000 milliLiter(s) (50 mL/Hr) IV Continuous <Continuous>  dextrose 50% Injectable 12.5 Gram(s) IV Push once  dextrose 50% Injectable 25 Gram(s) IV Push once  dextrose 50% Injectable 25 Gram(s) IV Push once  heparin  Injectable 5000 Unit(s) SubCutaneous every 8 hours  insulin lispro (HumaLOG) corrective regimen sliding scale   SubCutaneous three times a day before meals  insulin lispro (HumaLOG) corrective regimen sliding scale   SubCutaneous at bedtime  NIFEdipine XL 60 milliGRAM(s) Oral daily  ranolazine 1000 milliGRAM(s) Oral two times a day    MEDICATIONS  (PRN):  dextrose 40% Gel 15 Gram(s) Oral once PRN Blood Glucose LESS THAN 70 milliGRAM(s)/deciliter  glucagon  Injectable 1 milliGRAM(s) IntraMuscular once PRN Glucose LESS THAN 70 milligrams/deciliter    Allergies    No Known Allergies    Intolerances        Vital Signs Last 24 Hrs  T(C): 37.2 (29 Dec 2019 05:20), Max: 37.2 (29 Dec 2019 05:20)  T(F): 99 (29 Dec 2019 05:20), Max: 99 (29 Dec 2019 05:20)  HR: 84 (29 Dec 2019 05:20) (73 - 84)  BP: 110/66 (29 Dec 2019 05:20) (110/66 - 135/71)  BP(mean): --  RR: 21 (29 Dec 2019 05:20) (18 - 24)  SpO2: 96% (29 Dec 2019 05:20) (94% - 100%)  Daily Height in cm: 167.64 (29 Dec 2019 01:01)    Daily Weight in k (29 Dec 2019 01:01)  CAPILLARY BLOOD GLUCOSE      POCT Blood Glucose.: 163 mg/dL (28 Dec 2019 23:49)    I&O's Summary      PHYSICAL EXAM:  GENERAL: NAD, well-developed  HEAD:  Atraumatic, Normocephalic  EYES: EOMI, PERRLA, conjunctiva and sclera clear  NECK: Supple, No JVD  CHEST/LUNG: Clear to auscultation bilaterally; No wheeze  HEART: Regular rate and rhythm; Normal S1 S2, No murmurs, rubs, or gallops  ABDOMEN: Soft, Nontender, Nondistended; Bowel sounds present  EXTREMITIES:  2+ Peripheral Pulses, No clubbing, cyanosis, or edema  PSYCH: AAOx3  NEUROLOGY: non-focal  SKIN: No rashes or lesions    LABS:                        14.1   11.59 )-----------( 236      ( 28 Dec 2019 20:15 )             41.4     Hgb Trend: 14.1<--  12-    139  |  102  |  27<H>  ----------------------------<  148<H>  3.8   |  22  |  2.68<H>    Ca    10.1      28 Dec 2019 20:15    TPro  8.0  /  Alb  3.9  /  TBili  0.5  /  DBili  x   /  AST  11  /  ALT  12  /  AlkPhos  103  12-    Creatinine Trend: 2.68<--  LIVER FUNCTIONS - ( 28 Dec 2019 20:15 )  Alb: 3.9 g/dL / Pro: 8.0 g/dL / ALK PHOS: 103 u/L / ALT: 12 u/L / AST: 11 u/L / GGT: x             CARDIAC MARKERS ( 28 Dec 2019 20:15 )  x     / x     / 74 u/L / < 1.00 ng/mL / x Trishterrell Berry   PGY-1 Resident Physician   Pager [phone]/ [phone] from 7am to 7pm, after 7pm page night float     Patient is a 74y old  Male who presents with a chief complaint of GALEANO (29 Dec 2019 01:16)      SUBJECTIVE / OVERNIGHT EVENTS: Patient admitted overnight. This morning, patient sitting up eating breakfast, reports better breathing, denies chest pain/abdominal pain/nausea/vomiting.     MEDICATIONS  (STANDING):  albuterol/ipratropium for Nebulization 3 milliLiter(s) Nebulizer every 6 hours  atorvastatin 80 milliGRAM(s) Oral at bedtime  carvedilol 25 milliGRAM(s) Oral every 12 hours  dextrose 5%. 1000 milliLiter(s) (50 mL/Hr) IV Continuous <Continuous>  dextrose 50% Injectable 12.5 Gram(s) IV Push once  dextrose 50% Injectable 25 Gram(s) IV Push once  dextrose 50% Injectable 25 Gram(s) IV Push once  heparin  Injectable 5000 Unit(s) SubCutaneous every 8 hours  insulin lispro (HumaLOG) corrective regimen sliding scale   SubCutaneous three times a day before meals  insulin lispro (HumaLOG) corrective regimen sliding scale   SubCutaneous at bedtime  NIFEdipine XL 60 milliGRAM(s) Oral daily  ranolazine 1000 milliGRAM(s) Oral two times a day    MEDICATIONS  (PRN):  dextrose 40% Gel 15 Gram(s) Oral once PRN Blood Glucose LESS THAN 70 milliGRAM(s)/deciliter  glucagon  Injectable 1 milliGRAM(s) IntraMuscular once PRN Glucose LESS THAN 70 milligrams/deciliter    Allergies  No Known Allergies    Intolerances        Vital Signs Last 24 Hrs  T(C): 37.2 (29 Dec 2019 05:20), Max: 37.2 (29 Dec 2019 05:20)  T(F): 99 (29 Dec 2019 05:20), Max: 99 (29 Dec 2019 05:20)  HR: 84 (29 Dec 2019 05:20) (73 - 84)  BP: 110/66 (29 Dec 2019 05:20) (110/66 - 135/71)  BP(mean): --  RR: 21 (29 Dec 2019 05:20) (18 - 24)  SpO2: 96% (29 Dec 2019 05:20) (94% - 100%)  Daily Height in cm: 167.64 (29 Dec 2019 01:01)    Daily Weight in k (29 Dec 2019 01:01)  CAPILLARY BLOOD GLUCOSE      POCT Blood Glucose.: 163 mg/dL (28 Dec 2019 23:49)      PHYSICAL EXAM:  GENERAL: NAD, well-developed  HEAD:  Atraumatic, Normocephalic  EYES: EOMI, PERRLA, conjunctiva and sclera clear  NECK: Supple, No JVD  CHEST/LUNG: Clear to auscultation bilaterally; No wheeze/crackles   HEART: Regular rate and rhythm; Normal S1 S2, No murmurs, rubs, or gallops  ABDOMEN: Soft, Nontender, Distended abdomen, normal active bowel sounds   EXTREMITIES:  2+ Peripheral Pulses, No clubbing, cyanosis, or edema,  nonpalpable DP pulses bilaterally   PSYCH: AAO x3   SKIN: No rashes or lesions    LABS:                        14.1   11.59 )-----------( 236      ( 28 Dec 2019 20:15 )             41.4     Hgb Trend: 14.1<--  12-    139  |  102  |  27<H>  ----------------------------<  148<H>  3.8   |  22  |  2.68<H>    Ca    10.1      28 Dec 2019 20:15    TPro  8.0  /  Alb  3.9  /  TBili  0.5  /  DBili  x   /  AST  11  /  ALT  12  /  AlkPhos  103      Creatinine Trend: 2.68<--  LIVER FUNCTIONS - ( 28 Dec 2019 20:15 )  Alb: 3.9 g/dL / Pro: 8.0 g/dL / ALK PHOS: 103 u/L / ALT: 12 u/L / AST: 11 u/L / GGT: x             CARDIAC MARKERS ( 28 Dec 2019 20:15 )  x     / x     / 74 u/L / < 1.00 ng/mL / x

## 2019-12-29 NOTE — H&P ADULT - NSHPPHYSICALEXAM_GEN_ALL_CORE
T(C): 37.1 (12-29-19 @ 01:01), Max: 37.1 (12-28-19 @ 18:37)  HR: 76 (12-29-19 @ 01:01) (73 - 76)  BP: 117/62 (12-29-19 @ 01:01) (117/62 - 135/71)  RR: 20 (12-29-19 @ 01:01) (18 - 24)  SpO2: 96% (12-29-19 @ 01:01) (94% - 100%)  Wt(kg): --  GENERAL: NAD, well-developed  HEAD:  Atraumatic, Normocephalic  EYES: EOMI, PERRLA, conjunctiva and sclera clear  NECK: Supple, No JVD  CHEST/LUNG: bilateral coarse breath sounds  HEART: Regular rate and rhythm; No murmurs, rubs, or gallops  ABDOMEN: Soft, Nontender, Nondistended; Bowel sounds present  EXTREMITIES:  2+ Peripheral Pulses, No clubbing, cyanosis, or edema  PSYCH: AAOx3  NEUROLOGY: non-focal  SKIN: No rashes or lesions

## 2019-12-29 NOTE — H&P ADULT - PROBLEM SELECTOR PLAN 3
- CXR with bilateral opacity. ? edema. Otherwise no signs for acute HF exacerbation: neg proBNP and no swelling.  - Will continue to monitor fluid status. Daily weight. Strict I&Os  - Repeat TTE.  - C/w patient's outpatient HF meds including coreg, bumex.

## 2019-12-30 NOTE — DISCHARGE NOTE NURSING/CASE MANAGEMENT/SOCIAL WORK - NSSCTYPOFSERV_GEN_ALL_CORE
RN/PT. Nurse to visit on 12/31/19. Nurse to call prior to visit to arrange. Physical Therapy to follow. RN/PT. Nurse to visit on molly after discharge. Nurse to call prior to visit to arrange. Physical Therapy to follow.

## 2019-12-30 NOTE — PROGRESS NOTE ADULT - PROBLEM SELECTOR PLAN 2
Limited PMH, as patient receives all his care previously in [place]. Medication reconciliation did not show any medications for COPD.   Plan:   - q6h Duonebs.  - F/u CT chest  - observe off antibiotics for now  - observe off steroids for now UA notable for large leukocyte esterase, 30-50 WBC, moderate bacteria    Plan:   - ceftriaxone 2g daily

## 2019-12-30 NOTE — PROGRESS NOTE ADULT - PROBLEM SELECTOR PLAN 6
- BP well controlled on admission.  - Resume home regimen holding parameters. A1c 5.8, does not meet criteria for DM.   Plan:   - Hold po agents.  - Monitor FSG TID with meals/qHS

## 2019-12-30 NOTE — PROGRESS NOTE ADULT - PROBLEM SELECTOR PLAN 9
1.  Name of PCP:  2.  PCP Contacted on Admission: [ ] Y    [x] N    3.  PCP contacted at Discharge: [ ] Y    [ ] N    [ ] N/A  4.  Post-Discharge Appointment Date and Location:  5.  Summary of Handoff given to PCP: - HSQ for DVT ppx  - Carbohydrate consistent diet.  - PT

## 2019-12-30 NOTE — PROGRESS NOTE ADULT - PROBLEM SELECTOR PLAN 1
- Patient p/w resp distress. S/p steroids and Duonebs. Now with significant symptomatic improvement.  - Limited past medical history as patient receives all his care previously in [place].   - Unclear if the exacerbation is 2/2 COPD vs. heart failure vs. infectious etiology (pneumonia)     Plan:   - RVP negative  - DuoNeb q6h   - observe off antibiotics  - hold steroid for now JOHN. Per discussion w/ patient PMD, patient's Cr <2 in the summer.   Cr 2.68--> 2.75--> 2.97 (12/30).   Renal ultrasound   Right kidney:  No renal mass, hydronephrosis or calculi.  R renal: Simple cyst measuring up to 4.1 x 4 x 4.6 cm.    Plan:   - Urine electrolytes from 12/29 missing urine Na level, unable to calculate FeNa. Will re-obtain urine electrolytes, urine urea nitrogen, urine creatinine, to calculate FeNa and FeUrea   - strict Is and Os   - Avoid nephrotoxins.

## 2019-12-30 NOTE — PROGRESS NOTE ADULT - PROBLEM SELECTOR PLAN 7
- No acute signs for ACS: neg trop, no acute EKG ST changes and no chest pain.  - Continue to monitor sx.  - C/w home regimen: ASA, BB and ranolazine. - BP well controlled on admission.  - Resume home regimen holding parameters.

## 2019-12-30 NOTE — PROGRESS NOTE ADULT - PROBLEM SELECTOR PLAN 3
- CXR with bilateral opacity. ? edema. Otherwise no signs for acute HF exacerbation: neg proBNP and no swelling.    Plan:   - Will continue to monitor fluid status. Daily weight. Strict I&Os  - Repeat TTE.  - C/w patient's outpatient HF meds including coreg, bumex. Resolved. Currently on RA, SpO2 100%, normal respiratory effort, no accessory muscle use.     Plan:   - RVP negative  - DuoNeb q6h   - No need for antibiotics or steroids

## 2019-12-30 NOTE — DISCHARGE NOTE PROVIDER - CARE PROVIDER_API CALL
Carlos Zurita)  Internal Medicine  [address] [address]  Phone: [phone]  Fax: [phone]  Follow Up Time: 1 week

## 2019-12-30 NOTE — PROGRESS NOTE ADULT - SUBJECTIVE AND OBJECTIVE BOX
Trish Berry   PGY-1 Resident Physician   Pager [phone]/ [phone] from 7am to 7pm, after 7pm page night float     Patient is a 74y old  Male who presents with a chief complaint of GALEANO (29 Dec 2019 07:09)      SUBJECTIVE / OVERNIGHT EVENTS:    MEDICATIONS  (STANDING):  albuterol/ipratropium for Nebulization 3 milliLiter(s) Nebulizer every 6 hours  atorvastatin 80 milliGRAM(s) Oral at bedtime  carvedilol 25 milliGRAM(s) Oral every 12 hours  dextrose 50% Injectable 12.5 Gram(s) IV Push once  dextrose 50% Injectable 25 Gram(s) IV Push once  dextrose 50% Injectable 25 Gram(s) IV Push once  heparin  Injectable 5000 Unit(s) SubCutaneous every 8 hours  insulin lispro (HumaLOG) corrective regimen sliding scale   SubCutaneous three times a day before meals  insulin lispro (HumaLOG) corrective regimen sliding scale   SubCutaneous at bedtime  NIFEdipine XL 60 milliGRAM(s) Oral daily    MEDICATIONS  (PRN):  dextrose 40% Gel 15 Gram(s) Oral once PRN Blood Glucose LESS THAN 70 milliGRAM(s)/deciliter  glucagon  Injectable 1 milliGRAM(s) IntraMuscular once PRN Glucose LESS THAN 70 milligrams/deciliter  ranolazine 1000 milliGRAM(s) Oral two times a day PRN chest pain    Allergies    No Known Allergies    Intolerances        Vital Signs Last 24 Hrs  T(C): 36.5 (30 Dec 2019 05:03), Max: 36.7 (29 Dec 2019 14:19)  T(F): 97.7 (30 Dec 2019 05:03), Max: 98 (29 Dec 2019 14:19)  HR: 82 (30 Dec 2019 05:03) (70 - 82)  BP: 100/68 (30 Dec 2019 05:03) (100/68 - 126/65)  BP(mean): --  RR: 20 (30 Dec 2019 05:03) (19 - 20)  SpO2: 100% (30 Dec 2019 05:03) (96% - 100%)  Daily     Daily   CAPILLARY BLOOD GLUCOSE      POCT Blood Glucose.: 127 mg/dL (29 Dec 2019 21:28)  POCT Blood Glucose.: 166 mg/dL (29 Dec 2019 17:04)  POCT Blood Glucose.: 174 mg/dL (29 Dec 2019 12:08)  POCT Blood Glucose.: 142 mg/dL (29 Dec 2019 08:28)    I&O's Summary    29 Dec 2019 07:01  -  30 Dec 2019 06:55  --------------------------------------------------------  IN: 1518 mL / OUT: 1700 mL / NET: -182 mL        PHYSICAL EXAM:  GENERAL: NAD, well-developed  HEAD:  Atraumatic, Normocephalic  EYES: EOMI, PERRLA, conjunctiva and sclera clear  NECK: Supple, No JVD  CHEST/LUNG: Clear to auscultation bilaterally; No wheeze  HEART: Regular rate and rhythm; Normal S1 S2, No murmurs, rubs, or gallops  ABDOMEN: Soft, Nontender, Nondistended; Bowel sounds present  EXTREMITIES:  2+ Peripheral Pulses, No clubbing, cyanosis, or edema  PSYCH: AAOx3  NEUROLOGY: non-focal  SKIN: No rashes or lesions    LABS:                        14.2   11.02 )-----------( 264      ( 29 Dec 2019 06:10 )             42.0     Hgb Trend: 14.2<--, 14.1<--      135  |  100  |  30<H>  ----------------------------<  178<H>  3.4<L>   |  23  |  2.75<H>    Ca    10.1      29 Dec 2019 06:10  Phos  1.1       Mg     2.1         TPro  8.2  /  Alb  3.9  /  TBili  0.5  /  DBili  x   /  AST  11  /  ALT  12  /  AlkPhos  103      Creatinine Trend: 2.75<--, 2.68<--  LIVER FUNCTIONS - ( 29 Dec 2019 06:10 )  Alb: 3.9 g/dL / Pro: 8.2 g/dL / ALK PHOS: 103 u/L / ALT: 12 u/L / AST: 11 u/L / GGT: x             CARDIAC MARKERS ( 28 Dec 2019 20:15 )  x     / x     / 74 u/L / < 1.00 ng/mL / x          Urinalysis Basic - ( 29 Dec 2019 11:16 )    Color: YELLOW / Appearance: Lt TURBID / S.020 / pH: 7.0  Gluc: NEGATIVE / Ketone: NEGATIVE  / Bili: NEGATIVE / Urobili: NORMAL   Blood: LARGE / Protein: 50 / Nitrite: NEGATIVE   Leuk Esterase: LARGE / RBC: 20-30 / WBC 30-50   Sq Epi: x / Non Sq Epi: x / Bacteria: MODERATE Trishterrell Berry   PGY-1 Resident Physician   Pager [phone]/ [phone] from 7am to 7pm, after 7pm page night float     Patient is a 74y old  Male who presents with a chief complaint of GALEANO (29 Dec 2019 07:09)      SUBJECTIVE / OVERNIGHT EVENTS: No acute event overnight. Patient comfortable this morning, denies chest pain/shortness of breath/abdominal pain, tolerated dinner well.     MEDICATIONS  (STANDING):  albuterol/ipratropium for Nebulization 3 milliLiter(s) Nebulizer every 6 hours  atorvastatin 80 milliGRAM(s) Oral at bedtime  carvedilol 25 milliGRAM(s) Oral every 12 hours  dextrose 50% Injectable 12.5 Gram(s) IV Push once  dextrose 50% Injectable 25 Gram(s) IV Push once  dextrose 50% Injectable 25 Gram(s) IV Push once  heparin  Injectable 5000 Unit(s) SubCutaneous every 8 hours  insulin lispro (HumaLOG) corrective regimen sliding scale   SubCutaneous three times a day before meals  insulin lispro (HumaLOG) corrective regimen sliding scale   SubCutaneous at bedtime  NIFEdipine XL 60 milliGRAM(s) Oral daily    MEDICATIONS  (PRN):  dextrose 40% Gel 15 Gram(s) Oral once PRN Blood Glucose LESS THAN 70 milliGRAM(s)/deciliter  glucagon  Injectable 1 milliGRAM(s) IntraMuscular once PRN Glucose LESS THAN 70 milligrams/deciliter  ranolazine 1000 milliGRAM(s) Oral two times a day PRN chest pain    Allergies  No Known Allergies    Intolerances        Vital Signs Last 24 Hrs  T(C): 36.5 (30 Dec 2019 05:03), Max: 36.7 (29 Dec 2019 14:19)  T(F): 97.7 (30 Dec 2019 05:03), Max: 98 (29 Dec 2019 14:19)  HR: 82 (30 Dec 2019 05:03) (70 - 82)  BP: 100/68 (30 Dec 2019 05:03) (100/68 - 126/65)  BP(mean): --  RR: 20 (30 Dec 2019 05:03) (19 - 20)  SpO2: 100% (30 Dec 2019 05:03) (96% - 100%)  Daily     Daily   CAPILLARY BLOOD GLUCOSE      POCT Blood Glucose.: 127 mg/dL (29 Dec 2019 21:28)  POCT Blood Glucose.: 166 mg/dL (29 Dec 2019 17:04)  POCT Blood Glucose.: 174 mg/dL (29 Dec 2019 12:08)  POCT Blood Glucose.: 142 mg/dL (29 Dec 2019 08:28)    I&O's Summary    29 Dec 2019 07:01  -  30 Dec 2019 06:55  --------------------------------------------------------  IN: 1518 mL / OUT: 1700 mL / NET: -182 mL        PHYSICAL EXAM:  GENERAL: NAD, well-developed  HEAD:  Atraumatic, Normocephalic  EYES: EOMI, PERRLA, conjunctiva and sclera clear  NECK: Supple, No JVD  CHEST/LUNG: Clear to auscultation bilaterally; No wheeze or crackles   HEART: Regular rate and rhythm; Normal S1 S2, No murmurs, rubs, or gallops  ABDOMEN: Soft, Nontender, Nondistended; Bowel sounds present  EXTREMITIES:  2+ Peripheral Pulses, No clubbing, cyanosis, or edema  NEURO: alert, answers questions appropriately   SKIN: No rashes or lesions    LABS:             ( @ 06:15)                      12.9  12.84 )-----------( 259                 39.5    Neutrophils = 10.44 (81.2%)  Lymphocytes = 1.55 (12.1%)  Eosinophils = 0.00 (0.0%)  Basophils = 0.01 (0.1%)  Monocytes = 0.79 (6.2%)  Bands = --%        138  |  102  |  45<H>  ----------------------------<  122<H>  3.4<L>   |  22  |  2.97<H>    Ca    9.9      30 Dec 2019 06:15  Phos  3.2       Mg     2.2         TPro  8.2  /  Alb  3.9  /  TBili  0.5  /  DBili  x   /  AST  11  /  ALT  12  /  AlkPhos  103  12-29      CARDIAC MARKERS ( 28 Dec 2019 20:15 )  Trop x     / CK 74 u/L / CKMB < 1.00 ng/mL<L>     Urinalysis Basic - ( 29 Dec 2019 11:16 )    Color: YELLOW / Appearance: Lt TURBID / S.020 / pH: 7.0  Gluc: NEGATIVE / Ketone: NEGATIVE  / Bili: NEGATIVE / Urobili: NORMAL   Blood: LARGE / Protein: 50 / Nitrite: NEGATIVE   Leuk Esterase: LARGE / RBC: 20-30 / WBC 30-50   Sq Epi: x / Non Sq Epi: x / Bacteria: MODERATE Trishterrell Berry   PGY-1 Resident Physician   Pager [phone]/ [phone] from 7am to 7pm, after 7pm page night float     Patient is a 74y old  Male who presents with a chief complaint of GALEANO (29 Dec 2019 07:09)      SUBJECTIVE / OVERNIGHT EVENTS: No acute event overnight. Patient comfortable this morning, denies chest pain/shortness of breath/abdominal pain, tolerated dinner well.     MEDICATIONS  (STANDING):  albuterol/ipratropium for Nebulization 3 milliLiter(s) Nebulizer every 6 hours  atorvastatin 80 milliGRAM(s) Oral at bedtime  carvedilol 25 milliGRAM(s) Oral every 12 hours  dextrose 50% Injectable 12.5 Gram(s) IV Push once  dextrose 50% Injectable 25 Gram(s) IV Push once  dextrose 50% Injectable 25 Gram(s) IV Push once  heparin  Injectable 5000 Unit(s) SubCutaneous every 8 hours  insulin lispro (HumaLOG) corrective regimen sliding scale   SubCutaneous three times a day before meals  insulin lispro (HumaLOG) corrective regimen sliding scale   SubCutaneous at bedtime  NIFEdipine XL 60 milliGRAM(s) Oral daily    MEDICATIONS  (PRN):  dextrose 40% Gel 15 Gram(s) Oral once PRN Blood Glucose LESS THAN 70 milliGRAM(s)/deciliter  glucagon  Injectable 1 milliGRAM(s) IntraMuscular once PRN Glucose LESS THAN 70 milligrams/deciliter  ranolazine 1000 milliGRAM(s) Oral two times a day PRN chest pain    Allergies  No Known Allergies    Intolerances        Vital Signs Last 24 Hrs  T(C): 36.5 (30 Dec 2019 05:03), Max: 36.7 (29 Dec 2019 14:19)  T(F): 97.7 (30 Dec 2019 05:03), Max: 98 (29 Dec 2019 14:19)  HR: 82 (30 Dec 2019 05:03) (70 - 82)  BP: 100/68 (30 Dec 2019 05:03) (100/68 - 126/65)  BP(mean): --  RR: 20 (30 Dec 2019 05:03) (19 - 20)  SpO2: 100% (30 Dec 2019 05:03) (96% - 100%)  Daily     Daily   CAPILLARY BLOOD GLUCOSE      POCT Blood Glucose.: 127 mg/dL (29 Dec 2019 21:28)  POCT Blood Glucose.: 166 mg/dL (29 Dec 2019 17:04)  POCT Blood Glucose.: 174 mg/dL (29 Dec 2019 12:08)  POCT Blood Glucose.: 142 mg/dL (29 Dec 2019 08:28)    I&O's Summary    29 Dec 2019 07:01  -  30 Dec 2019 06:55  --------------------------------------------------------  IN: 1518 mL / OUT: 1700 mL / NET: -182 mL        PHYSICAL EXAM:  GENERAL: NAD, well-developed  HEAD:  Atraumatic, Normocephalic  EYES: EOMI, PERRLA, conjunctiva and sclera clear  NECK: Supple, No JVD  CHEST/LUNG: Clear to auscultation bilaterally; No wheeze or crackles   HEART: Regular rate and rhythm; Normal S1 S2, No murmurs, rubs, or gallops  ABDOMEN: Soft, Nontender, Nondistended; Bowel sounds present  EXTREMITIES:  2+ Peripheral Pulses, No clubbing, cyanosis, trace edema  NEURO: alert, answers questions appropriately   SKIN: No rashes or lesions    LABS:             ( @ 06:15)                      12.9  12.84 )-----------( 259                 39.5    Neutrophils = 10.44 (81.2%)  Lymphocytes = 1.55 (12.1%)  Eosinophils = 0.00 (0.0%)  Basophils = 0.01 (0.1%)  Monocytes = 0.79 (6.2%)  Bands = --%        138  |  102  |  45<H>  ----------------------------<  122<H>  3.4<L>   |  22  |  2.97<H>    Ca    9.9      30 Dec 2019 06:15  Phos  3.2       Mg     2.2         TPro  8.2  /  Alb  3.9  /  TBili  0.5  /  DBili  x   /  AST  11  /  ALT  12  /  AlkPhos  103  12-29      CARDIAC MARKERS ( 28 Dec 2019 20:15 )  Trop x     / CK 74 u/L / CKMB < 1.00 ng/mL<L>     Urinalysis Basic - ( 29 Dec 2019 11:16 )    Color: YELLOW / Appearance: Lt TURBID / S.020 / pH: 7.0  Gluc: NEGATIVE / Ketone: NEGATIVE  / Bili: NEGATIVE / Urobili: NORMAL   Blood: LARGE / Protein: 50 / Nitrite: NEGATIVE   Leuk Esterase: LARGE / RBC: 20-30 / WBC 30-50   Sq Epi: x / Non Sq Epi: x / Bacteria: MODERATE

## 2019-12-30 NOTE — PROGRESS NOTE ADULT - PROBLEM SELECTOR PLAN 8
- HSQ for DVT ppx  - Carbohydrate consistent diet.  - PT - No acute signs for ACS: neg trop, no acute EKG ST changes and no chest pain.  - Continue to monitor sx.  - C/w home regimen: ASA, BB and ranolazine.

## 2019-12-30 NOTE — PROGRESS NOTE ADULT - PROBLEM SELECTOR PLAN 4
- Unclear JOHN vs. CKD. Will obtain previous record from primary in the am.  Renal ultrasound no evidence of CKD.   Bladder scan 300cc+ residual volume.     Plan:   - Trend Cr.  - straight cath x1   - strict Is and Os  - F/u urine electrolytes, urine urea nitrogen, serum osmolality, urine osmolality   - Avoid nephrotoxins. Limited PMH, as patient receives all his care previously in [place]. Medication reconciliation did not show any medications for COPD.   Plan:   - q6h Duonebs.  - F/u CT chest  - observe off antibiotics for now  - observe off steroids for now

## 2019-12-30 NOTE — DISCHARGE NOTE PROVIDER - HOSPITAL COURSE
HPI: 74M w/ COPD, HTN, DMII, HLD, chronic smoker, CAD s/p CABG p/w 1 day of SOB. Daughter at bedside and provided history. Patient lately has multiple sick viral contacts at home. Patient became increasingly sob from 2 days ago. Patient denies fevers, chills, n/v, cough or sputum. Daughter reported witnessing patient got sob walking a few steps with audible wheezes. States he has been having some chest pain for a long time with walking. No chest pain today. Also had normal echo and stress test 2 weeks ago with his primary at [facility]. Otherwise, patient has no increasing lower extremity swelling. Continue to use 2 pillows at home without change.        En route, EMS provided patient w/ 12mg of dex, 2 treatments. In the ED, patient noted resp status improved after given duonebs. Also given azithro. CXR reveals see opacity for edema vs. infection.        Hospital course: HPI: 74M w/ COPD, HTN, DMII, HLD, chronic smoker, CAD s/p CABG p/w 1 day of SOB. Daughter at bedside and provided history. Patient lately has multiple sick viral contacts at home. Patient became increasingly sob from 2 days ago. Patient denies fevers, chills, n/v, cough or sputum. Daughter reported witnessing patient got sob walking a few steps with audible wheezes. States he has been having some chest pain for a long time with walking. No chest pain today. Also had normal echo and stress test 2 weeks ago with his primary at [facility]. Otherwise, patient has no increasing lower extremity swelling. Continue to use 2 pillows at home without change.        En route, EMS provided patient w/ 12mg of dex, 2 treatments. In the ED, patient noted resp status improved after given duonebs. Also given azithro.         Hospital course:     On admission, CXR notable for patchy bilateral opacities which may be secondary to edema, underlying pnuemonia is not excluded. Chest CT showed no pneumonia or evidence of pulmonary edema, focal moderate narrowing of the upper trachea. RVP negative. Patient's clinical presentation more consistent with viral illness, low suspicion for heart failure exacerbation or COPD exacerbation, thus patient was treated with DuoNeb q6h, observe off antibiotics or steroids. Patient's respiratory status marked improved, breathing comfortably on RA, SaO2 > 100% on RA. Patient's serum Cr elevated, per discussion with patient's PMD, marked elevation compared to baseline. Patient was treated with IV fluids, with decrease in Cr. Renal ultrasound revealed no renal mass, hydronephrosis or calculi; R simple cyst measuring up to 4.1 x 4 x 4.6 cm.                UA notable for large leukocyte esterase, 30-50 WBC, moderate bacteria HPI: 74M w/ COPD, HTN, DMII, HLD, chronic smoker, CAD s/p CABG p/w 1 day of SOB. Daughter at bedside and provided history. Patient lately has multiple sick viral contacts at home. Patient became increasingly sob from 2 days ago. Patient denies fevers, chills, n/v, cough or sputum. Daughter reported witnessing patient got sob walking a few steps with audible wheezes. States he has been having some chest pain for a long time with walking. No chest pain today. Also had normal echo and stress test 2 weeks ago with his primary at [facility]. Otherwise, patient has no increasing lower extremity swelling. Continue to use 2 pillows at home without change.        En route, EMS provided patient w/ 12mg of dex, 2 treatments. In the ED, patient noted resp status improved after given duonebs. Also given azithro.         Hospital course:     On admission, CXR notable for patchy bilateral opacities which may be secondary to edema, underlying pnuemonia is not excluded. Chest CT showed no pneumonia or evidence of pulmonary edema, focal moderate narrowing of the upper trachea. RVP negative. Patient's clinical presentation more consistent with viral illness, low suspicion for heart failure exacerbation or COPD exacerbation, thus patient was treated with DuoNeb q6h, observe off antibiotics or steroids. Patient's respiratory status marked improved, breathing comfortably on RA, SaO2 > 100% on RA. Patient's serum Cr 2.68 on admission, per discussion with patient's PMD,  elevated compared to baseline. Patient was treated with IV fluids, with decrease in Cr. Cr trend: 2.68 --> 2.75 --> 2.97 --> 2.43. Urinalysis was performed, FeNa 2.4%, FeUrea 58.5%, consistent with intrinsic renal disease. Renal ultrasound revealed no renal mass, hydronephrosis or calculi; R simple cyst measuring up to 4.1 x 4 x 4.6 cm. Considering patient 's kidney function has improved with IV fluid, more consistent with pre-renal etiology, no further workup was pursued, and patient will followup with PCP to continue trend his Cr.                 UA notable for large leukocyte esterase, 30-50 WBC, moderate bacteria. Patient was treated with ceftriaxone (12/30 --12/31), and discharged home with levaquin 250mg PO once daily x 5 days.                 Patient discharged home. HPI: 74M w/ COPD, HTN, DMII, HLD, chronic smoker, CAD s/p CABG p/w 1 day of SOB. Daughter at bedside and provided history. Patient lately has multiple sick viral contacts at home. Patient became increasingly sob from 2 days ago. Patient denies fevers, chills, n/v, cough or sputum. Daughter reported witnessing patient got sob walking a few steps with audible wheezes. States he has been having some chest pain for a long time with walking. No chest pain today. Also had normal echo and stress test 2 weeks ago with his primary at [facility]. Otherwise, patient has no increasing lower extremity swelling. Continue to use 2 pillows at home without change.        En route, EMS provided patient w/ 12mg of dex, 2 treatments. In the ED, patient noted resp status improved after given duonebs. Also given azithro.         Hospital course:     On admission, CXR notable for patchy bilateral opacities which may be secondary to edema, underlying pnuemonia is not excluded. Chest CT showed no pneumonia or evidence of pulmonary edema, focal moderate narrowing of the upper trachea. RVP negative. Patient's clinical presentation more consistent with viral illness, low suspicion for heart failure exacerbation or COPD exacerbation, thus patient was treated with DuoNeb q6h, observe off antibiotics or steroids. Patient's respiratory status marked improved, breathing comfortably on RA, SaO2 > 100% on RA. Patient's serum Cr 2.68 on admission, per discussion with patient's PMD,  elevated compared to baseline. Patient was treated with IV fluids, with decrease in Cr. Cr trend: 2.68 --> 2.75 --> 2.97 --> 2.43. Urinalysis was performed, FeNa 2.4%, FeUrea 58.5%, consistent with intrinsic renal disease. Renal ultrasound revealed no renal mass, hydronephrosis or calculi; R simple cyst measuring up to 4.1 x 4 x 4.6 cm. Considering patient 's kidney function has improved with IV fluid, more consistent with pre-renal etiology, no further workup was pursued, and patient will followup with PCP to continue trend his Cr.                 UA notable for large leukocyte esterase, 30-50 WBC, moderate bacteria. Patient was treated with ceftriaxone (12/30 --12/31), and discharged home with levaquin 250mg PO once daily x 5 days.                 Patient discharged home with outpatient follow-up

## 2019-12-30 NOTE — DISCHARGE NOTE NURSING/CASE MANAGEMENT/SOCIAL WORK - PATIENT PORTAL LINK FT
You can access the FollowMyHealth Patient Portal offered by [facility] by registering at the following website: [email or web address]. By joining [facility]’s FollowMyHealth portal, you will also be able to view your health information using other applications (apps) compatible with our system.

## 2019-12-30 NOTE — DISCHARGE NOTE PROVIDER - NSFOLLOWUPCLINICS_GEN_ALL_ED_FT
[facility] [place]  Internal Medicine  [address] [place] [address]  Phone: [phone]  Fax: [phone]  Follow Up Time: 1 week

## 2019-12-30 NOTE — DISCHARGE NOTE PROVIDER - NSDCMRMEDTOKEN_GEN_ALL_CORE_FT
Bumex 1 mg oral tablet: 1 tab(s) orally once a day  Coreg 25 mg oral tablet: 1 tab(s) orally 2 times a day  hydrALAZINE 25 mg oral tablet: 1 tab(s) orally 2 times a day  Lipitor 80 mg oral tablet: 1 tab(s) orally once a day  NIFEdipine 60 mg oral tablet, extended release: 1 tab(s) orally once a day  ranolazine 1000 mg oral tablet, extended release: 1 tab(s) orally 2 times a day  Senna 8.6 mg oral tablet: 1 tab(s) orally once a day (at bedtime)  SITagliptin 50 mg oral tablet: 1 tab(s) orally once a day  Symbicort 160 mcg-4.5 mcg/inh inhalation aerosol: 2 puff(s) inhaled 2 times a day Bumex 1 mg oral tablet: 1 tab(s) orally once a day  Coreg 25 mg oral tablet: 1 tab(s) orally 2 times a day  hydrALAZINE 25 mg oral tablet: 1 tab(s) orally 2 times a day  levoFLOXacin 250 mg oral tablet: 1 tab(s) orally once a day   Lipitor 80 mg oral tablet: 1 tab(s) orally once a day  NIFEdipine 60 mg oral tablet, extended release: 1 tab(s) orally once a day  ranolazine 1000 mg oral tablet, extended release: 1 tab(s) orally 2 times a day  SITagliptin 50 mg oral tablet: 1 tab(s) orally once a day  Symbicort 160 mcg-4.5 mcg/inh inhalation aerosol: 2 puff(s) inhaled 2 times a day

## 2019-12-30 NOTE — DISCHARGE NOTE NURSING/CASE MANAGEMENT/SOCIAL WORK - NSSCCARECORD_GEN_ALL_CORE
Home Care Agency/[facility] Medical [facility]/Durable Medical Equipment Agency/Home Care Agency

## 2019-12-30 NOTE — PROGRESS NOTE ADULT - PROBLEM SELECTOR PLAN 5
A1c 5.8, does not meet criteria for DM.   Plan:   - Hold po agents.  - Monitor FSG TID with meals/qHS - CXR with bilateral opacity. Otherwise no signs for acute HF exacerbation: neg proBNP and no swelling.    Plan:   - Will continue to monitor fluid status. Daily weight. Strict I&Os  - Repeat TTE.  - C/w patient's outpatient HF meds including coreg, bumex.

## 2019-12-30 NOTE — DISCHARGE NOTE NURSING/CASE MANAGEMENT/SOCIAL WORK - NSDCDMENAME_GEN_ALL_CORE_FT
[employer], awaiting insurance approval for walker, if approved to be delivered to pt's bedside. [facility].

## 2019-12-30 NOTE — DISCHARGE NOTE PROVIDER - NSDCCPCAREPLAN_GEN_ALL_CORE_FT
PRINCIPAL DISCHARGE DIAGNOSIS  Diagnosis: JOHN (acute kidney injury)  Assessment and Plan of Treatment:       SECONDARY DISCHARGE DIAGNOSES  Diagnosis: COPD (chronic obstructive pulmonary disease)  Assessment and Plan of Treatment: PRINCIPAL DISCHARGE DIAGNOSIS  Diagnosis: JOHN (acute kidney injury)  Assessment and Plan of Treatment: Your kidney function initially was impaired on admission. We gave you intravenous fluid, which improved your kidney function on discharge. We performed renal ultrasound which did not reveal any evidence of chronic damaged.   Please followup with your primary care physician within 1 week to continue trend your kidney function.      SECONDARY DISCHARGE DIAGNOSES  Diagnosis: Urinary tract infection  Assessment and Plan of Treatment: Your urinalysis is consistent with urinary tract infection. We treated you with intravenous antibiotics ceftriaxone, and will discharge you home on oral antibiotics, levaquin 250mg, once a day for 5 days.   Please report to your primary care physician if you develop fever,  increased urinary frequency, burning sensation on urination, or foul smelling urine.

## 2019-12-31 NOTE — PROGRESS NOTE ADULT - PROBLEM SELECTOR PLAN 4
Limited PMH, as patient receives all his care previously in [place]. Medication reconciliation did not show any medications for COPD.   Plan:   - q6h Duonebs.  - F/u CT chest  - observe off antibiotics for now  - observe off steroids for now

## 2019-12-31 NOTE — PROGRESS NOTE ADULT - PROBLEM SELECTOR PLAN 2
UA notable for large leukocyte esterase, 30-50 WBC, moderate bacteria    Plan:   - ceftriaxone 2g daily UA notable for large leukocyte esterase, 30-50 WBC, moderate bacteria    Plan:   - ceftriaxone 2g daily  - will discharge patient on levaquin 250mg PO daily x 5 days

## 2019-12-31 NOTE — PROGRESS NOTE ADULT - PROBLEM SELECTOR PLAN 3
Resolved. Currently on RA, SpO2 100%, normal respiratory effort, no accessory muscle use.     Plan:   - RVP negative  - DuoNeb q6h   - No need for antibiotics or steroids

## 2019-12-31 NOTE — PROGRESS NOTE ADULT - PROBLEM SELECTOR PLAN 1
JOHN. Per discussion w/ patient PMD, patient's Cr <2 in the summer.   Cr 2.68--> 2.75--> 2.97 (12/30).   Renal ultrasound   Right kidney:  No renal mass, hydronephrosis or calculi.  R renal: Simple cyst measuring up to 4.1 x 4 x 4.6 cm.    Plan:   - Urine electrolytes from 12/29 missing urine Na level, unable to calculate FeNa. Will re-obtain urine electrolytes, urine urea nitrogen, urine creatinine, to calculate FeNa and FeUrea   - strict Is and Os   - Avoid nephrotoxins. JOHN. Per discussion w/ patient PMD, patient's Cr <2 in the summer.   Cr 2.68--> 2.75--> 2.97, 2.43 today  Renal ultrasound   Right kidney:  No renal mass, hydronephrosis or calculi.  R renal: Simple cyst measuring up to 4.1 x 4 x 4.6 cm.    Plan:   - strict Is and Os   - Avoid nephrotoxins. JOHN. Per discussion w/ patient PMD, patient's Cr <2 in the summer.   Cr 2.68--> 2.75--> 2.97, 2.43 today.   Renal ultrasound   Right kidney:  No renal mass, hydronephrosis or calculi.  R renal: Simple cyst measuring up to 4.1 x 4 x 4.6 cm.    Plan:   - strict Is and Os   - Avoid nephrotoxins.  - FeNa 2.4%, FeUrea 58.5% consistent with intrinsic kidney disease. But patient's Cr improved with IV fluid , LR 100cc/hr x10hr 12/30 and 12/31

## 2019-12-31 NOTE — PROGRESS NOTE ADULT - SUBJECTIVE AND OBJECTIVE BOX
Trish Berry   PGY-1 Resident Physician   Pager [phone]/ [phone] from 7am to 7pm, after 7pm page night float     Patient is a 74y old  Male who presents with a chief complaint of GALEANO (30 Dec 2019 13:32)      SUBJECTIVE / OVERNIGHT EVENTS:    MEDICATIONS  (STANDING):  albuterol/ipratropium for Nebulization 3 milliLiter(s) Nebulizer every 6 hours  atorvastatin 80 milliGRAM(s) Oral at bedtime  carvedilol 25 milliGRAM(s) Oral every 12 hours  cefTRIAXone   IVPB 1000 milliGRAM(s) IV Intermittent every 24 hours  dextrose 50% Injectable 12.5 Gram(s) IV Push once  dextrose 50% Injectable 25 Gram(s) IV Push once  dextrose 50% Injectable 25 Gram(s) IV Push once  heparin  Injectable 5000 Unit(s) SubCutaneous every 8 hours  insulin lispro (HumaLOG) corrective regimen sliding scale   SubCutaneous three times a day before meals  insulin lispro (HumaLOG) corrective regimen sliding scale   SubCutaneous at bedtime  NIFEdipine XL 60 milliGRAM(s) Oral daily  ranolazine 1000 milliGRAM(s) Oral two times a day  sodium chloride 0.9%. 1000 milliLiter(s) (100 mL/Hr) IV Continuous <Continuous>    MEDICATIONS  (PRN):  dextrose 40% Gel 15 Gram(s) Oral once PRN Blood Glucose LESS THAN 70 milliGRAM(s)/deciliter  glucagon  Injectable 1 milliGRAM(s) IntraMuscular once PRN Glucose LESS THAN 70 milligrams/deciliter    Allergies    No Known Allergies    Intolerances        Vital Signs Last 24 Hrs  T(C): 36.8 (30 Dec 2019 22:08), Max: 36.8 (30 Dec 2019 22:08)  T(F): 98.3 (30 Dec 2019 22:08), Max: 98.3 (30 Dec 2019 22:08)  HR: 84 (30 Dec 2019 22:08) (61 - 84)  BP: 115/66 (30 Dec 2019 22:08) (100/68 - 138/80)  BP(mean): --  RR: 18 (30 Dec 2019 22:08) (18 - 20)  SpO2: 100% (30 Dec 2019 22:08) (93% - 100%)  Daily     Daily   CAPILLARY BLOOD GLUCOSE      POCT Blood Glucose.: 131 mg/dL (30 Dec 2019 22:11)  POCT Blood Glucose.: 110 mg/dL (30 Dec 2019 17:03)  POCT Blood Glucose.: 123 mg/dL (30 Dec 2019 12:13)  POCT Blood Glucose.: 97 mg/dL (30 Dec 2019 08:27)    I&O's Summary    29 Dec 2019 07:01  -  30 Dec 2019 07:00  --------------------------------------------------------  IN: 1518 mL / OUT: 1700 mL / NET: -182 mL    30 Dec 2019 07:01  -  31 Dec 2019 02:40  --------------------------------------------------------  IN: 1690 mL / OUT: 1300 mL / NET: 390 mL        PHYSICAL EXAM:  GENERAL: NAD, well-developed  HEAD:  Atraumatic, Normocephalic  EYES: EOMI, PERRLA, conjunctiva and sclera clear  NECK: Supple, No JVD  CHEST/LUNG: Clear to auscultation bilaterally; No wheeze  HEART: Regular rate and rhythm; Normal S1 S2, No murmurs, rubs, or gallops  ABDOMEN: Soft, Nontender, Nondistended; Bowel sounds present  EXTREMITIES:  2+ Peripheral Pulses, No clubbing, cyanosis, or edema  PSYCH: AAOx3  NEUROLOGY: non-focal  SKIN: No rashes or lesions    LABS:                        12.9   12.84 )-----------( 259      ( 30 Dec 2019 06:15 )             39.5     Hgb Trend: 12.9<--, 14.2<--, 14.1<--  12    138  |  102  |  45<H>  ----------------------------<  122<H>  3.4<L>   |  22  |  2.97<H>    Ca    9.9      30 Dec 2019 06:15  Phos  3.2       Mg     2.2         TPro  8.2  /  Alb  3.9  /  TBili  0.5  /  DBili  x   /  AST  11  /  ALT  12  /  AlkPhos  103  12-    Creatinine Trend: 2.97<--, 2.75<--, 2.68<--  LIVER FUNCTIONS - ( 29 Dec 2019 06:10 )  Alb: 3.9 g/dL / Pro: 8.2 g/dL / ALK PHOS: 103 u/L / ALT: 12 u/L / AST: 11 u/L / GGT: x                 Urinalysis Basic - ( 29 Dec 2019 11:16 )    Color: YELLOW / Appearance: Lt TURBID / S.020 / pH: 7.0  Gluc: NEGATIVE / Ketone: NEGATIVE  / Bili: NEGATIVE / Urobili: NORMAL   Blood: LARGE / Protein: 50 / Nitrite: NEGATIVE   Leuk Esterase: LARGE / RBC: 20-30 / WBC 30-50   Sq Epi: x / Non Sq Epi: x / Bacteria: MODERATE Trishterrell Berry   PGY-1 Resident Physician   Pager [phone] from 7am to 7pm, after 7pm page night float     Patient is a 74y old  Male who presents with a chief complaint of GALEANO (30 Dec 2019 13:32)      SUBJECTIVE / OVERNIGHT EVENTS: No acute event overnight. Patient comfortable this morning, denies chest pain/shortness of breath/abdominal pain/nausea/vomiting.     MEDICATIONS  (STANDING):  albuterol/ipratropium for Nebulization 3 milliLiter(s) Nebulizer every 6 hours  atorvastatin 80 milliGRAM(s) Oral at bedtime  carvedilol 25 milliGRAM(s) Oral every 12 hours  cefTRIAXone   IVPB 1000 milliGRAM(s) IV Intermittent every 24 hours  dextrose 50% Injectable 12.5 Gram(s) IV Push once  dextrose 50% Injectable 25 Gram(s) IV Push once  dextrose 50% Injectable 25 Gram(s) IV Push once  heparin  Injectable 5000 Unit(s) SubCutaneous every 8 hours  insulin lispro (HumaLOG) corrective regimen sliding scale   SubCutaneous three times a day before meals  insulin lispro (HumaLOG) corrective regimen sliding scale   SubCutaneous at bedtime  NIFEdipine XL 60 milliGRAM(s) Oral daily  ranolazine 1000 milliGRAM(s) Oral two times a day  sodium chloride 0.9%. 1000 milliLiter(s) (100 mL/Hr) IV Continuous <Continuous>    MEDICATIONS  (PRN):  dextrose 40% Gel 15 Gram(s) Oral once PRN Blood Glucose LESS THAN 70 milliGRAM(s)/deciliter  glucagon  Injectable 1 milliGRAM(s) IntraMuscular once PRN Glucose LESS THAN 70 milligrams/deciliter    Allergies    No Known Allergies    Intolerances        Vital Signs Last 24 Hrs  T(C): 36.8 (30 Dec 2019 22:08), Max: 36.8 (30 Dec 2019 22:08)  T(F): 98.3 (30 Dec 2019 22:08), Max: 98.3 (30 Dec 2019 22:08)  HR: 84 (30 Dec 2019 22:08) (61 - 84)  BP: 115/66 (30 Dec 2019 22:08) (100/68 - 138/80)  BP(mean): --  RR: 18 (30 Dec 2019 22:08) (18 - 20)  SpO2: 100% (30 Dec 2019 22:08) (93% - 100%)  Daily     Daily   CAPILLARY BLOOD GLUCOSE      POCT Blood Glucose.: 131 mg/dL (30 Dec 2019 22:11)  POCT Blood Glucose.: 110 mg/dL (30 Dec 2019 17:03)  POCT Blood Glucose.: 123 mg/dL (30 Dec 2019 12:13)  POCT Blood Glucose.: 97 mg/dL (30 Dec 2019 08:27)    I&O's Summary    29 Dec 2019 07:01  -  30 Dec 2019 07:00  --------------------------------------------------------  IN: 1518 mL / OUT: 1700 mL / NET: -182 mL    30 Dec 2019 07:01  -  31 Dec 2019 02:40  --------------------------------------------------------  IN: 1690 mL / OUT: 1300 mL / NET: 390 mL        PHYSICAL EXAM:  GENERAL: NAD, well-developed  HEAD:  Atraumatic, Normocephalic  EYES: EOMI, PERRLA, conjunctiva and sclera clear  NECK: Supple, No JVD  CHEST/LUNG: Clear to auscultation bilaterally; No wheeze/crackles   HEART: Regular rate and rhythm; Normal S1 S2, No murmurs, rubs, or gallops  ABDOMEN: Soft, Nontender, Nondistended; Bowel sounds present  EXTREMITIES:  2+ Peripheral Pulses, No clubbing, cyanosis, or edema  NEUROLOGY: Alert, oriented x 3.   SKIN: No rashes or lesions    LABS:                        (12-31 @ 05:40)                      13.3  9.14 )-----------( 287                 40.4    Neutrophils = 5.64 (61.8%)  Lymphocytes = 2.57 (28.1%)  Eosinophils = 0.14 (1.5%)  Basophils = 0.03 (0.3%)  Monocytes = 0.72 (7.9%)  Bands = --%    12-31    137  |  102  |  40<H>  ----------------------------<  103<H>  3.2<L>   |  21<L>  |  2.43<H>    Ca    9.3      31 Dec 2019 05:40  Phos  3.2     12-31  Mg     2.2     12-31

## 2019-12-31 NOTE — PROGRESS NOTE ADULT - PROBLEM SELECTOR PLAN 5
- CXR with bilateral opacity. Otherwise no signs for acute HF exacerbation: neg proBNP and no swelling.    Plan:   - Will continue to monitor fluid status. Daily weight. Strict I&Os  - Repeat TTE.  - C/w patient's outpatient HF meds including coreg, bumex. - CXR with bilateral opacity. Otherwise no signs for acute HF exacerbation: neg proBNP and no swelling.    Plan:   - Will continue to monitor fluid status. Daily weight. Strict I&Os  - C/w patient's outpatient HF meds including coreg, bumex.

## 2019-12-31 NOTE — PROGRESS NOTE ADULT - PROBLEM SELECTOR PLAN 6
A1c 5.8, does not meet criteria for DM.   Plan:   - Hold po agents.  - Monitor FSG TID with meals/qHS

## 2019-12-31 NOTE — PROGRESS NOTE ADULT - ATTENDING COMMENTS
Pt seen and examined with HS on above date.  Agree with above HS note.  Plan discussed with House staff.    74 male COPD/? CHF a/w shortness of breath resolved, found to have elevated Crt and UTI.  will complete UTI tx as outpatient.  Will contact PMD regarding baseline Crt.  If Crt at or near baseline, will dc today with o/p follow-up.  DC time 40 minutes
Pt seen and examined with HS on above date.  Agree with above HS note.  Plan discussed with House staff.    74 male COPD/? CHF a/w shortness of breath resolved, found to have elevated Crt and UTI.  Crt improved with IVF.  Will complete abx as o/p for UTI. o/p PMD follow-up for likely ckd and management of CHF/COPD/CAD. DC time 38 minutes
74M w/COPD, CAD s/p cabg, DMII, HTN, HLD p/w dyspnea on exertion, and new elevation in Cr?. Pt and daughter not aware of any kidney disease, will need to obtain collateral from PMD regarding comorbidities. Resp state is stable, improved on nebulizers, CT chest pending, although low suspicion for PNA.

## 2019-12-31 NOTE — PROGRESS NOTE ADULT - PROBLEM SELECTOR PLAN 10
1.  Name of PCP:  2.  PCP Contacted on Admission: [ ] Y    [x] N    3.  PCP contacted at Discharge: [ ] Y    [ ] N    [ ] N/A  4.  Post-Discharge Appointment Date and Location:  5.  Summary of Handoff given to PCP: 1.  Name of PCP:  2.  PCP Contacted on Admission: [x ] Y    [] N    3.  PCP contacted at Discharge: [ ] Y    [ ] N    [ ] N/A  4.  Post-Discharge Appointment Date and Location:  5.  Summary of Handoff given to PCP:

## 2020-01-01 ENCOUNTER — INPATIENT (INPATIENT)
Facility: HOSPITAL | Age: 75
LOS: 7 days | End: 2020-04-21
Attending: INTERNAL MEDICINE | Admitting: INTERNAL MEDICINE
Payer: MEDICARE

## 2020-01-01 VITALS
WEIGHT: 250 LBS | HEART RATE: 99 BPM | DIASTOLIC BLOOD PRESSURE: 72 MMHG | SYSTOLIC BLOOD PRESSURE: 143 MMHG | RESPIRATION RATE: 40 BRPM | OXYGEN SATURATION: 96 % | TEMPERATURE: 101 F

## 2020-01-01 VITALS
RESPIRATION RATE: 20 BRPM | DIASTOLIC BLOOD PRESSURE: 43 MMHG | SYSTOLIC BLOOD PRESSURE: 63 MMHG | HEART RATE: 46 BPM | OXYGEN SATURATION: 71 %

## 2020-01-01 DIAGNOSIS — I49.01 VENTRICULAR FIBRILLATION: ICD-10-CM

## 2020-01-01 DIAGNOSIS — E78.5 HYPERLIPIDEMIA, UNSPECIFIED: ICD-10-CM

## 2020-01-01 DIAGNOSIS — I25.10 ATHEROSCLEROTIC HEART DISEASE OF NATIVE CORONARY ARTERY WITHOUT ANGINA PECTORIS: ICD-10-CM

## 2020-01-01 DIAGNOSIS — I46.2 CARDIAC ARREST DUE TO UNDERLYING CARDIAC CONDITION: ICD-10-CM

## 2020-01-01 DIAGNOSIS — I47.2 VENTRICULAR TACHYCARDIA: ICD-10-CM

## 2020-01-01 DIAGNOSIS — Z95.1 PRESENCE OF AORTOCORONARY BYPASS GRAFT: Chronic | ICD-10-CM

## 2020-01-01 DIAGNOSIS — B95.2 ENTEROCOCCUS AS THE CAUSE OF DISEASES CLASSIFIED ELSEWHERE: ICD-10-CM

## 2020-01-01 DIAGNOSIS — R57.9 SHOCK, UNSPECIFIED: ICD-10-CM

## 2020-01-01 DIAGNOSIS — J96.90 RESPIRATORY FAILURE, UNSPECIFIED, UNSPECIFIED WHETHER WITH HYPOXIA OR HYPERCAPNIA: ICD-10-CM

## 2020-01-01 DIAGNOSIS — N39.0 URINARY TRACT INFECTION, SITE NOT SPECIFIED: ICD-10-CM

## 2020-01-01 DIAGNOSIS — J44.0 CHRONIC OBSTRUCTIVE PULMONARY DISEASE WITH (ACUTE) LOWER RESPIRATORY INFECTION: ICD-10-CM

## 2020-01-01 DIAGNOSIS — J12.89 OTHER VIRAL PNEUMONIA: ICD-10-CM

## 2020-01-01 DIAGNOSIS — U07.1 COVID-19: ICD-10-CM

## 2020-01-01 DIAGNOSIS — N17.0 ACUTE KIDNEY FAILURE WITH TUBULAR NECROSIS: ICD-10-CM

## 2020-01-01 DIAGNOSIS — I10 ESSENTIAL (PRIMARY) HYPERTENSION: ICD-10-CM

## 2020-01-01 DIAGNOSIS — E11.65 TYPE 2 DIABETES MELLITUS WITH HYPERGLYCEMIA: ICD-10-CM

## 2020-01-01 DIAGNOSIS — J80 ACUTE RESPIRATORY DISTRESS SYNDROME: ICD-10-CM

## 2020-01-01 DIAGNOSIS — E66.9 OBESITY, UNSPECIFIED: ICD-10-CM

## 2020-01-01 DIAGNOSIS — Z95.1 PRESENCE OF AORTOCORONARY BYPASS GRAFT: ICD-10-CM

## 2020-01-01 LAB
-  AMPICILLIN: SIGNIFICANT CHANGE UP
-  CIPROFLOXACIN: SIGNIFICANT CHANGE UP
-  COAGULASE NEGATIVE STAPHYLOCOCCUS: SIGNIFICANT CHANGE UP
-  LEVOFLOXACIN: SIGNIFICANT CHANGE UP
-  NITROFURANTOIN: SIGNIFICANT CHANGE UP
-  TETRACYCLINE: SIGNIFICANT CHANGE UP
-  VANCOMYCIN: SIGNIFICANT CHANGE UP
A1C WITH ESTIMATED AVERAGE GLUCOSE RESULT: 6.1 % — HIGH (ref 4–5.6)
A1C WITH ESTIMATED AVERAGE GLUCOSE RESULT: 6.2 % — HIGH (ref 4–5.6)
ALBUMIN SERPL ELPH-MCNC: 2.2 G/DL — LOW (ref 3.3–5)
ALBUMIN SERPL ELPH-MCNC: 2.2 G/DL — LOW (ref 3.3–5)
ALBUMIN SERPL ELPH-MCNC: 2.4 G/DL — LOW (ref 3.3–5)
ALBUMIN SERPL ELPH-MCNC: 2.7 G/DL — LOW (ref 3.3–5)
ALP SERPL-CCNC: 61 U/L — SIGNIFICANT CHANGE UP (ref 40–120)
ALP SERPL-CCNC: 66 U/L — SIGNIFICANT CHANGE UP (ref 40–120)
ALP SERPL-CCNC: 68 U/L — SIGNIFICANT CHANGE UP (ref 40–120)
ALP SERPL-CCNC: 68 U/L — SIGNIFICANT CHANGE UP (ref 40–120)
ALP SERPL-CCNC: 72 U/L — SIGNIFICANT CHANGE UP (ref 40–120)
ALP SERPL-CCNC: 74 U/L — SIGNIFICANT CHANGE UP (ref 40–120)
ALP SERPL-CCNC: 76 U/L — SIGNIFICANT CHANGE UP (ref 40–120)
ALT FLD-CCNC: 174 U/L — HIGH (ref 12–78)
ALT FLD-CCNC: 219 U/L — HIGH (ref 12–78)
ALT FLD-CCNC: 219 U/L — HIGH (ref 12–78)
ALT FLD-CCNC: 285 U/L — HIGH (ref 12–78)
ALT FLD-CCNC: 303 U/L — HIGH (ref 12–78)
ALT FLD-CCNC: 50 U/L — SIGNIFICANT CHANGE UP (ref 12–78)
ALT FLD-CCNC: 75 U/L — SIGNIFICANT CHANGE UP (ref 12–78)
ANION GAP SERPL CALC-SCNC: 10 MMOL/L — SIGNIFICANT CHANGE UP (ref 5–17)
ANION GAP SERPL CALC-SCNC: 11 MMOL/L — SIGNIFICANT CHANGE UP (ref 5–17)
ANION GAP SERPL CALC-SCNC: 12 MMOL/L — SIGNIFICANT CHANGE UP (ref 5–17)
ANION GAP SERPL CALC-SCNC: 13 MMOL/L — SIGNIFICANT CHANGE UP (ref 5–17)
ANION GAP SERPL CALC-SCNC: 16 MMOL/L — SIGNIFICANT CHANGE UP (ref 5–17)
ANION GAP SERPL CALC-SCNC: 16 MMOL/L — SIGNIFICANT CHANGE UP (ref 5–17)
ANION GAP SERPL CALC-SCNC: 20 MMOL/L — HIGH (ref 5–17)
ANION GAP SERPL CALC-SCNC: 22 MMOL/L — HIGH (ref 5–17)
ANION GAP SERPL CALC-SCNC: 9 MMOL/L — SIGNIFICANT CHANGE UP (ref 5–17)
APPEARANCE UR: ABNORMAL
APTT BLD: 116.5 SEC — HIGH (ref 28.5–37)
APTT BLD: 33 SEC — SIGNIFICANT CHANGE UP (ref 28.5–37)
APTT BLD: 43.1 SEC — HIGH (ref 28.5–37)
APTT BLD: 48.6 SEC — HIGH (ref 28.5–37)
APTT BLD: 48.6 SEC — HIGH (ref 28.5–37)
APTT BLD: 51.3 SEC — HIGH (ref 28.5–37)
APTT BLD: 55.5 SEC — HIGH (ref 28.5–37)
APTT BLD: 57.4 SEC — HIGH (ref 28.5–37)
APTT BLD: 58.7 SEC — HIGH (ref 28.5–37)
APTT BLD: 58.8 SEC — HIGH (ref 28.5–37)
APTT BLD: 58.8 SEC — HIGH (ref 28.5–37)
APTT BLD: 60.4 SEC — HIGH (ref 28.5–37)
APTT BLD: 60.5 SEC — HIGH (ref 28.5–37)
APTT BLD: 62.1 SEC — HIGH (ref 28.5–37)
APTT BLD: 62.9 SEC — HIGH (ref 28.5–37)
APTT BLD: 68.6 SEC — HIGH (ref 28.5–37)
APTT BLD: 98.7 SEC — HIGH (ref 28.5–37)
AST SERPL-CCNC: 101 U/L — HIGH (ref 15–37)
AST SERPL-CCNC: 16 U/L — SIGNIFICANT CHANGE UP (ref 15–37)
AST SERPL-CCNC: 250 U/L — HIGH (ref 15–37)
AST SERPL-CCNC: 349 U/L — HIGH (ref 15–37)
AST SERPL-CCNC: 416 U/L — HIGH (ref 15–37)
AST SERPL-CCNC: 438 U/L — HIGH (ref 15–37)
AST SERPL-CCNC: 84 U/L — HIGH (ref 15–37)
BACTERIA # UR AUTO: ABNORMAL
BASE EXCESS BLDA CALC-SCNC: -2.5 MMOL/L — LOW (ref -2–2)
BASE EXCESS BLDA CALC-SCNC: -4.4 MMOL/L — LOW (ref -2–2)
BASE EXCESS BLDA CALC-SCNC: -4.9 MMOL/L — LOW (ref -2–2)
BASE EXCESS BLDA CALC-SCNC: -5 MMOL/L — LOW (ref -2–2)
BASE EXCESS BLDA CALC-SCNC: -5.8 MMOL/L — LOW (ref -2–2)
BASE EXCESS BLDA CALC-SCNC: -6.3 MMOL/L — LOW (ref -2–2)
BASE EXCESS BLDA CALC-SCNC: -7.9 MMOL/L — LOW (ref -2–2)
BASE EXCESS BLDA CALC-SCNC: 1.2 MMOL/L — SIGNIFICANT CHANGE UP (ref -2–2)
BASOPHILS # BLD AUTO: 0 K/UL — SIGNIFICANT CHANGE UP (ref 0–0.2)
BASOPHILS # BLD AUTO: 0.01 K/UL — SIGNIFICANT CHANGE UP (ref 0–0.2)
BASOPHILS # BLD AUTO: 0.02 K/UL — SIGNIFICANT CHANGE UP (ref 0–0.2)
BASOPHILS # BLD AUTO: 0.02 K/UL — SIGNIFICANT CHANGE UP (ref 0–0.2)
BASOPHILS # BLD AUTO: 0.03 K/UL — SIGNIFICANT CHANGE UP (ref 0–0.2)
BASOPHILS NFR BLD AUTO: 0 % — SIGNIFICANT CHANGE UP (ref 0–2)
BASOPHILS NFR BLD AUTO: 0.1 % — SIGNIFICANT CHANGE UP (ref 0–2)
BASOPHILS NFR BLD AUTO: 0.2 % — SIGNIFICANT CHANGE UP (ref 0–2)
BILIRUB SERPL-MCNC: 0.5 MG/DL — SIGNIFICANT CHANGE UP (ref 0.2–1.2)
BILIRUB SERPL-MCNC: 0.6 MG/DL — SIGNIFICANT CHANGE UP (ref 0.2–1.2)
BILIRUB SERPL-MCNC: 0.7 MG/DL — SIGNIFICANT CHANGE UP (ref 0.2–1.2)
BILIRUB SERPL-MCNC: 0.8 MG/DL — SIGNIFICANT CHANGE UP (ref 0.2–1.2)
BILIRUB SERPL-MCNC: 0.9 MG/DL — SIGNIFICANT CHANGE UP (ref 0.2–1.2)
BILIRUB UR-MCNC: NEGATIVE — SIGNIFICANT CHANGE UP
BLOOD GAS COMMENTS: SIGNIFICANT CHANGE UP
BLOOD GAS SOURCE: SIGNIFICANT CHANGE UP
BUN SERPL-MCNC: 103 MG/DL — HIGH (ref 7–23)
BUN SERPL-MCNC: 128 MG/DL — HIGH (ref 7–23)
BUN SERPL-MCNC: 148 MG/DL — HIGH (ref 7–23)
BUN SERPL-MCNC: 169 MG/DL — HIGH (ref 7–23)
BUN SERPL-MCNC: 36 MG/DL — HIGH (ref 7–23)
BUN SERPL-MCNC: 44 MG/DL — HIGH (ref 7–23)
BUN SERPL-MCNC: 55 MG/DL — HIGH (ref 7–23)
BUN SERPL-MCNC: 57 MG/DL — HIGH (ref 7–23)
BUN SERPL-MCNC: 61 MG/DL — HIGH (ref 7–23)
BUN SERPL-MCNC: 65 MG/DL — HIGH (ref 7–23)
BUN SERPL-MCNC: 85 MG/DL — HIGH (ref 7–23)
CALCIUM SERPL-MCNC: 7.4 MG/DL — LOW (ref 8.5–10.1)
CALCIUM SERPL-MCNC: 7.5 MG/DL — LOW (ref 8.5–10.1)
CALCIUM SERPL-MCNC: 7.6 MG/DL — LOW (ref 8.5–10.1)
CALCIUM SERPL-MCNC: 7.7 MG/DL — LOW (ref 8.5–10.1)
CALCIUM SERPL-MCNC: 7.9 MG/DL — LOW (ref 8.5–10.1)
CALCIUM SERPL-MCNC: 8.1 MG/DL — LOW (ref 8.5–10.1)
CALCIUM SERPL-MCNC: 8.2 MG/DL — LOW (ref 8.5–10.1)
CALCIUM SERPL-MCNC: 8.4 MG/DL — LOW (ref 8.5–10.1)
CALCIUM SERPL-MCNC: 8.9 MG/DL — SIGNIFICANT CHANGE UP (ref 8.5–10.1)
CHLORIDE SERPL-SCNC: 102 MMOL/L — SIGNIFICANT CHANGE UP (ref 96–108)
CHLORIDE SERPL-SCNC: 102 MMOL/L — SIGNIFICANT CHANGE UP (ref 96–108)
CHLORIDE SERPL-SCNC: 106 MMOL/L — SIGNIFICANT CHANGE UP (ref 96–108)
CHLORIDE SERPL-SCNC: 107 MMOL/L — SIGNIFICANT CHANGE UP (ref 96–108)
CHLORIDE SERPL-SCNC: 107 MMOL/L — SIGNIFICANT CHANGE UP (ref 96–108)
CHLORIDE SERPL-SCNC: 111 MMOL/L — HIGH (ref 96–108)
CHLORIDE SERPL-SCNC: 113 MMOL/L — HIGH (ref 96–108)
CHLORIDE SERPL-SCNC: 114 MMOL/L — HIGH (ref 96–108)
CHLORIDE SERPL-SCNC: 114 MMOL/L — HIGH (ref 96–108)
CHLORIDE SERPL-SCNC: 93 MMOL/L — LOW (ref 96–108)
CHLORIDE SERPL-SCNC: 97 MMOL/L — SIGNIFICANT CHANGE UP (ref 96–108)
CHOLEST SERPL-MCNC: 144 MG/DL — SIGNIFICANT CHANGE UP (ref 10–199)
CHOLEST SERPL-MCNC: 149 MG/DL — SIGNIFICANT CHANGE UP (ref 10–199)
CK SERPL-CCNC: 1030 U/L — HIGH (ref 26–308)
CO2 SERPL-SCNC: 20 MMOL/L — LOW (ref 22–31)
CO2 SERPL-SCNC: 22 MMOL/L — SIGNIFICANT CHANGE UP (ref 22–31)
CO2 SERPL-SCNC: 22 MMOL/L — SIGNIFICANT CHANGE UP (ref 22–31)
CO2 SERPL-SCNC: 23 MMOL/L — SIGNIFICANT CHANGE UP (ref 22–31)
CO2 SERPL-SCNC: 24 MMOL/L — SIGNIFICANT CHANGE UP (ref 22–31)
CO2 SERPL-SCNC: 25 MMOL/L — SIGNIFICANT CHANGE UP (ref 22–31)
COLOR SPEC: YELLOW — SIGNIFICANT CHANGE UP
CREAT SERPL-MCNC: 3.16 MG/DL — HIGH (ref 0.5–1.3)
CREAT SERPL-MCNC: 4.51 MG/DL — HIGH (ref 0.5–1.3)
CREAT SERPL-MCNC: 5.79 MG/DL — HIGH (ref 0.5–1.3)
CREAT SERPL-MCNC: 6.07 MG/DL — HIGH (ref 0.5–1.3)
CREAT SERPL-MCNC: 6.11 MG/DL — HIGH (ref 0.5–1.3)
CREAT SERPL-MCNC: 6.93 MG/DL — HIGH (ref 0.5–1.3)
CREAT SERPL-MCNC: 8.05 MG/DL — HIGH (ref 0.5–1.3)
CREAT SERPL-MCNC: 8.05 MG/DL — HIGH (ref 0.5–1.3)
CREAT SERPL-MCNC: 8.15 MG/DL — HIGH (ref 0.5–1.3)
CREAT SERPL-MCNC: 8.22 MG/DL — HIGH (ref 0.5–1.3)
CREAT SERPL-MCNC: 8.33 MG/DL — HIGH (ref 0.5–1.3)
CRP SERPL-MCNC: 16.13 MG/DL — HIGH (ref 0–0.4)
CRP SERPL-MCNC: 22.8 MG/DL — HIGH (ref 0–0.4)
CRP SERPL-MCNC: 5.59 MG/DL — HIGH (ref 0–0.4)
CULTURE RESULTS: SIGNIFICANT CHANGE UP
D DIMER BLD IA.RAPID-MCNC: 1573 NG/ML DDU — HIGH
D DIMER BLD IA.RAPID-MCNC: 2102 NG/ML DDU — HIGH
D DIMER BLD IA.RAPID-MCNC: 3690 NG/ML DDU — HIGH
D DIMER BLD IA.RAPID-MCNC: 6097 NG/ML DDU — HIGH
DIFF PNL FLD: ABNORMAL
EOSINOPHIL # BLD AUTO: 0 K/UL — SIGNIFICANT CHANGE UP (ref 0–0.5)
EOSINOPHIL NFR BLD AUTO: 0 % — SIGNIFICANT CHANGE UP (ref 0–6)
EPI CELLS # UR: ABNORMAL
ERYTHROCYTE [SEDIMENTATION RATE] IN BLOOD: 66 MM/HR — HIGH (ref 0–20)
ERYTHROCYTE [SEDIMENTATION RATE] IN BLOOD: 70 MM/HR — HIGH (ref 0–20)
ERYTHROCYTE [SEDIMENTATION RATE] IN BLOOD: 75 MM/HR — HIGH (ref 0–20)
ERYTHROCYTE [SEDIMENTATION RATE] IN BLOOD: 88 MM/HR — HIGH (ref 0–20)
ESTIMATED AVERAGE GLUCOSE: 128 MG/DL — HIGH (ref 68–114)
ESTIMATED AVERAGE GLUCOSE: 131 MG/DL — HIGH (ref 68–114)
FERRITIN SERPL-MCNC: 1717 NG/ML — HIGH (ref 30–400)
FERRITIN SERPL-MCNC: 2975 NG/ML — HIGH (ref 30–400)
FERRITIN SERPL-MCNC: 4240 NG/ML — HIGH (ref 30–400)
FERRITIN SERPL-MCNC: 5882 NG/ML — HIGH (ref 30–400)
FERRITIN SERPL-MCNC: 630 NG/ML — HIGH (ref 30–400)
FERRITIN SERPL-MCNC: 726 NG/ML — HIGH (ref 30–400)
FIBRINOGEN PPP-MCNC: 662 MG/DL — HIGH (ref 350–510)
GLUCOSE BLDC GLUCOMTR-MCNC: 135 MG/DL — HIGH (ref 70–99)
GLUCOSE BLDC GLUCOMTR-MCNC: 178 MG/DL — HIGH (ref 70–99)
GLUCOSE BLDC GLUCOMTR-MCNC: 183 MG/DL — HIGH (ref 70–99)
GLUCOSE BLDC GLUCOMTR-MCNC: 187 MG/DL — HIGH (ref 70–99)
GLUCOSE BLDC GLUCOMTR-MCNC: 192 MG/DL — HIGH (ref 70–99)
GLUCOSE BLDC GLUCOMTR-MCNC: 192 MG/DL — HIGH (ref 70–99)
GLUCOSE BLDC GLUCOMTR-MCNC: 194 MG/DL — HIGH (ref 70–99)
GLUCOSE BLDC GLUCOMTR-MCNC: 198 MG/DL — HIGH (ref 70–99)
GLUCOSE BLDC GLUCOMTR-MCNC: 198 MG/DL — HIGH (ref 70–99)
GLUCOSE BLDC GLUCOMTR-MCNC: 201 MG/DL — HIGH (ref 70–99)
GLUCOSE BLDC GLUCOMTR-MCNC: 204 MG/DL — HIGH (ref 70–99)
GLUCOSE BLDC GLUCOMTR-MCNC: 208 MG/DL — HIGH (ref 70–99)
GLUCOSE BLDC GLUCOMTR-MCNC: 208 MG/DL — HIGH (ref 70–99)
GLUCOSE BLDC GLUCOMTR-MCNC: 210 MG/DL — HIGH (ref 70–99)
GLUCOSE BLDC GLUCOMTR-MCNC: 210 MG/DL — HIGH (ref 70–99)
GLUCOSE BLDC GLUCOMTR-MCNC: 213 MG/DL — HIGH (ref 70–99)
GLUCOSE BLDC GLUCOMTR-MCNC: 218 MG/DL — HIGH (ref 70–99)
GLUCOSE BLDC GLUCOMTR-MCNC: 218 MG/DL — HIGH (ref 70–99)
GLUCOSE BLDC GLUCOMTR-MCNC: 226 MG/DL — HIGH (ref 70–99)
GLUCOSE BLDC GLUCOMTR-MCNC: 226 MG/DL — HIGH (ref 70–99)
GLUCOSE BLDC GLUCOMTR-MCNC: 230 MG/DL — HIGH (ref 70–99)
GLUCOSE BLDC GLUCOMTR-MCNC: 231 MG/DL — HIGH (ref 70–99)
GLUCOSE BLDC GLUCOMTR-MCNC: 234 MG/DL — HIGH (ref 70–99)
GLUCOSE BLDC GLUCOMTR-MCNC: 234 MG/DL — HIGH (ref 70–99)
GLUCOSE BLDC GLUCOMTR-MCNC: 247 MG/DL — HIGH (ref 70–99)
GLUCOSE BLDC GLUCOMTR-MCNC: 249 MG/DL — HIGH (ref 70–99)
GLUCOSE BLDC GLUCOMTR-MCNC: 250 MG/DL — HIGH (ref 70–99)
GLUCOSE BLDC GLUCOMTR-MCNC: 253 MG/DL — HIGH (ref 70–99)
GLUCOSE BLDC GLUCOMTR-MCNC: 254 MG/DL — HIGH (ref 70–99)
GLUCOSE BLDC GLUCOMTR-MCNC: 255 MG/DL — HIGH (ref 70–99)
GLUCOSE BLDC GLUCOMTR-MCNC: 257 MG/DL — HIGH (ref 70–99)
GLUCOSE BLDC GLUCOMTR-MCNC: 257 MG/DL — HIGH (ref 70–99)
GLUCOSE BLDC GLUCOMTR-MCNC: 264 MG/DL — HIGH (ref 70–99)
GLUCOSE BLDC GLUCOMTR-MCNC: 264 MG/DL — HIGH (ref 70–99)
GLUCOSE BLDC GLUCOMTR-MCNC: 278 MG/DL — HIGH (ref 70–99)
GLUCOSE BLDC GLUCOMTR-MCNC: 279 MG/DL — HIGH (ref 70–99)
GLUCOSE BLDC GLUCOMTR-MCNC: 282 MG/DL — HIGH (ref 70–99)
GLUCOSE BLDC GLUCOMTR-MCNC: 311 MG/DL — HIGH (ref 70–99)
GLUCOSE SERPL-MCNC: 138 MG/DL — HIGH (ref 70–99)
GLUCOSE SERPL-MCNC: 200 MG/DL — HIGH (ref 70–99)
GLUCOSE SERPL-MCNC: 202 MG/DL — HIGH (ref 70–99)
GLUCOSE SERPL-MCNC: 203 MG/DL — HIGH (ref 70–99)
GLUCOSE SERPL-MCNC: 213 MG/DL — HIGH (ref 70–99)
GLUCOSE SERPL-MCNC: 220 MG/DL — HIGH (ref 70–99)
GLUCOSE SERPL-MCNC: 261 MG/DL — HIGH (ref 70–99)
GLUCOSE SERPL-MCNC: 267 MG/DL — HIGH (ref 70–99)
GLUCOSE SERPL-MCNC: 268 MG/DL — HIGH (ref 70–99)
GLUCOSE SERPL-MCNC: 298 MG/DL — HIGH (ref 70–99)
GLUCOSE SERPL-MCNC: 305 MG/DL — HIGH (ref 70–99)
GLUCOSE UR QL: 50 MG/DL
GRAM STN FLD: SIGNIFICANT CHANGE UP
GRAM STN FLD: SIGNIFICANT CHANGE UP
HCO3 BLDA-SCNC: 20 MMOL/L — LOW (ref 21–29)
HCO3 BLDA-SCNC: 20 MMOL/L — LOW (ref 21–29)
HCO3 BLDA-SCNC: 22 MMOL/L — SIGNIFICANT CHANGE UP (ref 21–29)
HCO3 BLDA-SCNC: 24 MMOL/L — SIGNIFICANT CHANGE UP (ref 21–29)
HCO3 BLDA-SCNC: 24 MMOL/L — SIGNIFICANT CHANGE UP (ref 21–29)
HCO3 BLDA-SCNC: 27 MMOL/L — SIGNIFICANT CHANGE UP (ref 21–29)
HCT VFR BLD CALC: 29.8 % — LOW (ref 39–50)
HCT VFR BLD CALC: 30.2 % — LOW (ref 39–50)
HCT VFR BLD CALC: 30.3 % — LOW (ref 39–50)
HCT VFR BLD CALC: 31 % — LOW (ref 39–50)
HCT VFR BLD CALC: 31.9 % — LOW (ref 39–50)
HCT VFR BLD CALC: 36.7 % — LOW (ref 39–50)
HCT VFR BLD CALC: 37 % — LOW (ref 39–50)
HCT VFR BLD CALC: 37.9 % — LOW (ref 39–50)
HCT VFR BLD CALC: 39.8 % — SIGNIFICANT CHANGE UP (ref 39–50)
HCT VFR BLD CALC: 41 % — SIGNIFICANT CHANGE UP (ref 39–50)
HCT VFR BLD CALC: 41 % — SIGNIFICANT CHANGE UP (ref 39–50)
HDLC SERPL-MCNC: 27 MG/DL — LOW
HDLC SERPL-MCNC: 27 MG/DL — LOW
HGB BLD-MCNC: 10.1 G/DL — LOW (ref 13–17)
HGB BLD-MCNC: 11.5 G/DL — LOW (ref 13–17)
HGB BLD-MCNC: 11.7 G/DL — LOW (ref 13–17)
HGB BLD-MCNC: 12.1 G/DL — LOW (ref 13–17)
HGB BLD-MCNC: 12.5 G/DL — LOW (ref 13–17)
HGB BLD-MCNC: 13 G/DL — SIGNIFICANT CHANGE UP (ref 13–17)
HGB BLD-MCNC: 13.4 G/DL — SIGNIFICANT CHANGE UP (ref 13–17)
HGB BLD-MCNC: 9.7 G/DL — LOW (ref 13–17)
HGB BLD-MCNC: 9.9 G/DL — LOW (ref 13–17)
HOROWITZ INDEX BLDA+IHG-RTO: 100 — SIGNIFICANT CHANGE UP
HOROWITZ INDEX BLDA+IHG-RTO: 50 — SIGNIFICANT CHANGE UP
HOROWITZ INDEX BLDA+IHG-RTO: 80 — SIGNIFICANT CHANGE UP
HOROWITZ INDEX BLDA+IHG-RTO: 80 — SIGNIFICANT CHANGE UP
IMM GRANULOCYTES NFR BLD AUTO: 0.2 % — SIGNIFICANT CHANGE UP (ref 0–1.5)
IMM GRANULOCYTES NFR BLD AUTO: 1.2 % — SIGNIFICANT CHANGE UP (ref 0–1.5)
IMM GRANULOCYTES NFR BLD AUTO: 1.9 % — HIGH (ref 0–1.5)
IMM GRANULOCYTES NFR BLD AUTO: 4.8 % — HIGH (ref 0–1.5)
INR BLD: 1.14 RATIO — SIGNIFICANT CHANGE UP (ref 0.88–1.16)
KETONES UR-MCNC: NEGATIVE — SIGNIFICANT CHANGE UP
LACTATE SERPL-SCNC: 1.6 MMOL/L — SIGNIFICANT CHANGE UP (ref 0.7–2)
LACTATE SERPL-SCNC: 1.8 MMOL/L — SIGNIFICANT CHANGE UP (ref 0.7–2)
LDH SERPL L TO P-CCNC: 432 U/L — HIGH (ref 50–242)
LDH SERPL L TO P-CCNC: 437 U/L — HIGH (ref 50–242)
LDH SERPL L TO P-CCNC: 526 U/L — HIGH (ref 50–242)
LDH SERPL L TO P-CCNC: 920 U/L — HIGH (ref 50–242)
LEUKOCYTE ESTERASE UR-ACNC: ABNORMAL
LIPID PNL WITH DIRECT LDL SERPL: 57 MG/DL — SIGNIFICANT CHANGE UP
LIPID PNL WITH DIRECT LDL SERPL: 66 MG/DL — SIGNIFICANT CHANGE UP
LYMPHOCYTES # BLD AUTO: 0.44 K/UL — LOW (ref 1–3.3)
LYMPHOCYTES # BLD AUTO: 0.75 K/UL — LOW (ref 1–3.3)
LYMPHOCYTES # BLD AUTO: 0.75 K/UL — LOW (ref 1–3.3)
LYMPHOCYTES # BLD AUTO: 1.62 K/UL — SIGNIFICANT CHANGE UP (ref 1–3.3)
LYMPHOCYTES # BLD AUTO: 1.82 K/UL — SIGNIFICANT CHANGE UP (ref 1–3.3)
LYMPHOCYTES # BLD AUTO: 10.2 % — LOW (ref 13–44)
LYMPHOCYTES # BLD AUTO: 18.9 % — SIGNIFICANT CHANGE UP (ref 13–44)
LYMPHOCYTES # BLD AUTO: 3.4 % — LOW (ref 13–44)
LYMPHOCYTES # BLD AUTO: 4 % — LOW (ref 13–44)
LYMPHOCYTES # BLD AUTO: 4.6 % — LOW (ref 13–44)
MAGNESIUM SERPL-MCNC: 2.8 MG/DL — HIGH (ref 1.6–2.6)
MAGNESIUM SERPL-MCNC: 3 MG/DL — HIGH (ref 1.6–2.6)
MAGNESIUM SERPL-MCNC: 3.1 MG/DL — HIGH (ref 1.6–2.6)
MAGNESIUM SERPL-MCNC: 3.1 MG/DL — HIGH (ref 1.6–2.6)
MAGNESIUM SERPL-MCNC: 3.2 MG/DL — HIGH (ref 1.6–2.6)
MAGNESIUM SERPL-MCNC: 3.5 MG/DL — HIGH (ref 1.6–2.6)
MAGNESIUM SERPL-MCNC: 3.6 MG/DL — HIGH (ref 1.6–2.6)
MAGNESIUM SERPL-MCNC: 3.9 MG/DL — HIGH (ref 1.6–2.6)
MCHC RBC-ENTMCNC: 30 PG — SIGNIFICANT CHANGE UP (ref 27–34)
MCHC RBC-ENTMCNC: 30.1 PG — SIGNIFICANT CHANGE UP (ref 27–34)
MCHC RBC-ENTMCNC: 30.3 PG — SIGNIFICANT CHANGE UP (ref 27–34)
MCHC RBC-ENTMCNC: 30.4 PG — SIGNIFICANT CHANGE UP (ref 27–34)
MCHC RBC-ENTMCNC: 30.4 PG — SIGNIFICANT CHANGE UP (ref 27–34)
MCHC RBC-ENTMCNC: 30.5 PG — SIGNIFICANT CHANGE UP (ref 27–34)
MCHC RBC-ENTMCNC: 30.5 PG — SIGNIFICANT CHANGE UP (ref 27–34)
MCHC RBC-ENTMCNC: 30.6 PG — SIGNIFICANT CHANGE UP (ref 27–34)
MCHC RBC-ENTMCNC: 30.7 PG — SIGNIFICANT CHANGE UP (ref 27–34)
MCHC RBC-ENTMCNC: 30.8 PG — SIGNIFICANT CHANGE UP (ref 27–34)
MCHC RBC-ENTMCNC: 31 PG — SIGNIFICANT CHANGE UP (ref 27–34)
MCHC RBC-ENTMCNC: 31.1 GM/DL — LOW (ref 32–36)
MCHC RBC-ENTMCNC: 31.4 GM/DL — LOW (ref 32–36)
MCHC RBC-ENTMCNC: 31.7 GM/DL — LOW (ref 32–36)
MCHC RBC-ENTMCNC: 31.7 GM/DL — LOW (ref 32–36)
MCHC RBC-ENTMCNC: 31.9 GM/DL — LOW (ref 32–36)
MCHC RBC-ENTMCNC: 31.9 GM/DL — LOW (ref 32–36)
MCHC RBC-ENTMCNC: 32.6 GM/DL — SIGNIFICANT CHANGE UP (ref 32–36)
MCHC RBC-ENTMCNC: 32.6 GM/DL — SIGNIFICANT CHANGE UP (ref 32–36)
MCHC RBC-ENTMCNC: 32.7 GM/DL — SIGNIFICANT CHANGE UP (ref 32–36)
MCHC RBC-ENTMCNC: 32.8 GM/DL — SIGNIFICANT CHANGE UP (ref 32–36)
MCHC RBC-ENTMCNC: 33.3 GM/DL — SIGNIFICANT CHANGE UP (ref 32–36)
MCV RBC AUTO: 91.5 FL — SIGNIFICANT CHANGE UP (ref 80–100)
MCV RBC AUTO: 92.3 FL — SIGNIFICANT CHANGE UP (ref 80–100)
MCV RBC AUTO: 92.5 FL — SIGNIFICANT CHANGE UP (ref 80–100)
MCV RBC AUTO: 93.2 FL — SIGNIFICANT CHANGE UP (ref 80–100)
MCV RBC AUTO: 93.8 FL — SIGNIFICANT CHANGE UP (ref 80–100)
MCV RBC AUTO: 95 FL — SIGNIFICANT CHANGE UP (ref 80–100)
MCV RBC AUTO: 96.1 FL — SIGNIFICANT CHANGE UP (ref 80–100)
MCV RBC AUTO: 96.6 FL — SIGNIFICANT CHANGE UP (ref 80–100)
MCV RBC AUTO: 97.1 FL — SIGNIFICANT CHANGE UP (ref 80–100)
MCV RBC AUTO: 97.9 FL — SIGNIFICANT CHANGE UP (ref 80–100)
MCV RBC AUTO: 97.9 FL — SIGNIFICANT CHANGE UP (ref 80–100)
METHOD TYPE: SIGNIFICANT CHANGE UP
METHOD TYPE: SIGNIFICANT CHANGE UP
MONOCYTES # BLD AUTO: 0.38 K/UL — SIGNIFICANT CHANGE UP (ref 0–0.9)
MONOCYTES # BLD AUTO: 0.69 K/UL — SIGNIFICANT CHANGE UP (ref 0–0.9)
MONOCYTES # BLD AUTO: 0.81 K/UL — SIGNIFICANT CHANGE UP (ref 0–0.9)
MONOCYTES # BLD AUTO: 0.84 K/UL — SIGNIFICANT CHANGE UP (ref 0–0.9)
MONOCYTES # BLD AUTO: 0.88 K/UL — SIGNIFICANT CHANGE UP (ref 0–0.9)
MONOCYTES NFR BLD AUTO: 2 % — SIGNIFICANT CHANGE UP (ref 2–14)
MONOCYTES NFR BLD AUTO: 4.5 % — SIGNIFICANT CHANGE UP (ref 2–14)
MONOCYTES NFR BLD AUTO: 5.2 % — SIGNIFICANT CHANGE UP (ref 2–14)
MONOCYTES NFR BLD AUTO: 6.8 % — SIGNIFICANT CHANGE UP (ref 2–14)
MONOCYTES NFR BLD AUTO: 8 % — SIGNIFICANT CHANGE UP (ref 2–14)
NEUTROPHILS # BLD AUTO: 10.96 K/UL — HIGH (ref 1.8–7.4)
NEUTROPHILS # BLD AUTO: 14.23 K/UL — HIGH (ref 1.8–7.4)
NEUTROPHILS # BLD AUTO: 15.02 K/UL — HIGH (ref 1.8–7.4)
NEUTROPHILS # BLD AUTO: 16.23 K/UL — HIGH (ref 1.8–7.4)
NEUTROPHILS # BLD AUTO: 6.24 K/UL — SIGNIFICANT CHANGE UP (ref 1.8–7.4)
NEUTROPHILS NFR BLD AUTO: 72.8 % — SIGNIFICANT CHANGE UP (ref 43–77)
NEUTROPHILS NFR BLD AUTO: 82 % — HIGH (ref 43–77)
NEUTROPHILS NFR BLD AUTO: 84 % — HIGH (ref 43–77)
NEUTROPHILS NFR BLD AUTO: 84.8 % — HIGH (ref 43–77)
NEUTROPHILS NFR BLD AUTO: 88.2 % — HIGH (ref 43–77)
NITRITE UR-MCNC: NEGATIVE — SIGNIFICANT CHANGE UP
NRBC # BLD: 0 /100 WBCS — SIGNIFICANT CHANGE UP (ref 0–0)
NRBC # BLD: 1 /100 WBCS — HIGH (ref 0–0)
NRBC # BLD: 2 /100 WBCS — HIGH (ref 0–0)
NRBC # BLD: 2 /100 WBCS — HIGH (ref 0–0)
NRBC # BLD: SIGNIFICANT CHANGE UP /100 WBCS (ref 0–0)
NT-PROBNP SERPL-SCNC: 712 PG/ML — HIGH (ref 0–125)
ORGANISM # SPEC MICROSCOPIC CNT: SIGNIFICANT CHANGE UP
PCO2 BLDA: 37 MMHG — SIGNIFICANT CHANGE UP (ref 32–46)
PCO2 BLDA: 49 MMHG — HIGH (ref 32–46)
PCO2 BLDA: 51 MMHG — HIGH (ref 32–46)
PCO2 BLDA: 53 MMHG — HIGH (ref 32–46)
PCO2 BLDA: 53 MMHG — HIGH (ref 32–46)
PCO2 BLDA: 54 MMHG — HIGH (ref 32–46)
PCO2 BLDA: 63 MMHG — HIGH (ref 32–46)
PCO2 BLDA: 69 MMHG — HIGH (ref 32–46)
PH BLD: 7.17 — CRITICAL LOW (ref 7.35–7.45)
PH BLD: 7.18 — CRITICAL LOW (ref 7.35–7.45)
PH BLD: 7.22 — LOW (ref 7.35–7.45)
PH BLD: 7.22 — LOW (ref 7.35–7.45)
PH BLD: 7.24 — LOW (ref 7.35–7.45)
PH BLD: 7.25 — LOW (ref 7.35–7.45)
PH BLD: 7.34 — LOW (ref 7.35–7.45)
PH BLD: 7.35 — SIGNIFICANT CHANGE UP (ref 7.35–7.45)
PH UR: 5 — SIGNIFICANT CHANGE UP (ref 5–8)
PHOSPHATE SERPL-MCNC: 10.1 MG/DL — SIGNIFICANT CHANGE UP (ref 2.5–4.5)
PHOSPHATE SERPL-MCNC: 11.5 MG/DL — HIGH (ref 2.5–4.5)
PHOSPHATE SERPL-MCNC: 14 MG/DL — HIGH (ref 2.5–4.5)
PHOSPHATE SERPL-MCNC: 4 MG/DL — SIGNIFICANT CHANGE UP (ref 2.5–4.5)
PHOSPHATE SERPL-MCNC: 7 MG/DL — HIGH (ref 2.5–4.5)
PHOSPHATE SERPL-MCNC: 8.5 MG/DL — HIGH (ref 2.5–4.5)
PHOSPHATE SERPL-MCNC: 8.7 MG/DL — SIGNIFICANT CHANGE UP (ref 2.5–4.5)
PHOSPHATE SERPL-MCNC: 9.3 MG/DL — HIGH (ref 2.5–4.5)
PLATELET # BLD AUTO: 186 K/UL — SIGNIFICANT CHANGE UP (ref 150–400)
PLATELET # BLD AUTO: 215 K/UL — SIGNIFICANT CHANGE UP (ref 150–400)
PLATELET # BLD AUTO: 235 K/UL — SIGNIFICANT CHANGE UP (ref 150–400)
PLATELET # BLD AUTO: 253 K/UL — SIGNIFICANT CHANGE UP (ref 150–400)
PLATELET # BLD AUTO: 256 K/UL — SIGNIFICANT CHANGE UP (ref 150–400)
PLATELET # BLD AUTO: 257 K/UL — SIGNIFICANT CHANGE UP (ref 150–400)
PLATELET # BLD AUTO: 262 K/UL — SIGNIFICANT CHANGE UP (ref 150–400)
PLATELET # BLD AUTO: 274 K/UL — SIGNIFICANT CHANGE UP (ref 150–400)
PLATELET # BLD AUTO: 278 K/UL — SIGNIFICANT CHANGE UP (ref 150–400)
PLATELET # BLD AUTO: 282 K/UL — SIGNIFICANT CHANGE UP (ref 150–400)
PLATELET # BLD AUTO: 292 K/UL — SIGNIFICANT CHANGE UP (ref 150–400)
PO2 BLDA: 101 MMHG — SIGNIFICANT CHANGE UP (ref 74–108)
PO2 BLDA: 102 MMHG — SIGNIFICANT CHANGE UP (ref 74–108)
PO2 BLDA: 133 MMHG — HIGH (ref 74–108)
PO2 BLDA: 148 MMHG — HIGH (ref 74–108)
PO2 BLDA: 53 MMHG — LOW (ref 74–108)
PO2 BLDA: 61 MMHG — LOW (ref 74–108)
PO2 BLDA: 83 MMHG — SIGNIFICANT CHANGE UP (ref 74–108)
PO2 BLDA: 84 MMHG — SIGNIFICANT CHANGE UP (ref 74–108)
POTASSIUM SERPL-MCNC: 3.6 MMOL/L — SIGNIFICANT CHANGE UP (ref 3.5–5.3)
POTASSIUM SERPL-MCNC: 3.6 MMOL/L — SIGNIFICANT CHANGE UP (ref 3.5–5.3)
POTASSIUM SERPL-MCNC: 3.8 MMOL/L — SIGNIFICANT CHANGE UP (ref 3.5–5.3)
POTASSIUM SERPL-MCNC: 4 MMOL/L — SIGNIFICANT CHANGE UP (ref 3.5–5.3)
POTASSIUM SERPL-MCNC: 4.1 MMOL/L — SIGNIFICANT CHANGE UP (ref 3.5–5.3)
POTASSIUM SERPL-MCNC: 4.7 MMOL/L — SIGNIFICANT CHANGE UP (ref 3.5–5.3)
POTASSIUM SERPL-MCNC: 5 MMOL/L — SIGNIFICANT CHANGE UP (ref 3.5–5.3)
POTASSIUM SERPL-MCNC: 5.4 MMOL/L — HIGH (ref 3.5–5.3)
POTASSIUM SERPL-MCNC: 5.9 MMOL/L — HIGH (ref 3.5–5.3)
POTASSIUM SERPL-SCNC: 3.6 MMOL/L — SIGNIFICANT CHANGE UP (ref 3.5–5.3)
POTASSIUM SERPL-SCNC: 3.6 MMOL/L — SIGNIFICANT CHANGE UP (ref 3.5–5.3)
POTASSIUM SERPL-SCNC: 3.8 MMOL/L — SIGNIFICANT CHANGE UP (ref 3.5–5.3)
POTASSIUM SERPL-SCNC: 4 MMOL/L — SIGNIFICANT CHANGE UP (ref 3.5–5.3)
POTASSIUM SERPL-SCNC: 4.1 MMOL/L — SIGNIFICANT CHANGE UP (ref 3.5–5.3)
POTASSIUM SERPL-SCNC: 4.7 MMOL/L — SIGNIFICANT CHANGE UP (ref 3.5–5.3)
POTASSIUM SERPL-SCNC: 5 MMOL/L — SIGNIFICANT CHANGE UP (ref 3.5–5.3)
POTASSIUM SERPL-SCNC: 5.4 MMOL/L — HIGH (ref 3.5–5.3)
POTASSIUM SERPL-SCNC: 5.9 MMOL/L — HIGH (ref 3.5–5.3)
PROCALCITONIN SERPL-MCNC: 0.48 NG/ML — HIGH (ref 0.02–0.1)
PROT SERPL-MCNC: 6.6 GM/DL — SIGNIFICANT CHANGE UP (ref 6–8.3)
PROT SERPL-MCNC: 6.7 GM/DL — SIGNIFICANT CHANGE UP (ref 6–8.3)
PROT SERPL-MCNC: 6.9 GM/DL — SIGNIFICANT CHANGE UP (ref 6–8.3)
PROT SERPL-MCNC: 7.2 GM/DL — SIGNIFICANT CHANGE UP (ref 6–8.3)
PROT SERPL-MCNC: 7.3 GM/DL — SIGNIFICANT CHANGE UP (ref 6–8.3)
PROT SERPL-MCNC: 7.5 GM/DL — SIGNIFICANT CHANGE UP (ref 6–8.3)
PROT SERPL-MCNC: 7.5 GM/DL — SIGNIFICANT CHANGE UP (ref 6–8.3)
PROT UR-MCNC: 500 MG/DL
PROTHROM AB SERPL-ACNC: 12.8 SEC — SIGNIFICANT CHANGE UP (ref 10–12.9)
RBC # BLD: 3.23 M/UL — LOW (ref 4.2–5.8)
RBC # BLD: 3.24 M/UL — LOW (ref 4.2–5.8)
RBC # BLD: 3.31 M/UL — LOW (ref 4.2–5.8)
RBC # BLD: 3.32 M/UL — LOW (ref 4.2–5.8)
RBC # BLD: 3.35 M/UL — LOW (ref 4.2–5.8)
RBC # BLD: 3.78 M/UL — LOW (ref 4.2–5.8)
RBC # BLD: 3.8 M/UL — LOW (ref 4.2–5.8)
RBC # BLD: 3.99 M/UL — LOW (ref 4.2–5.8)
RBC # BLD: 4.1 M/UL — LOW (ref 4.2–5.8)
RBC # BLD: 4.19 M/UL — LOW (ref 4.2–5.8)
RBC # BLD: 4.37 M/UL — SIGNIFICANT CHANGE UP (ref 4.2–5.8)
RBC # FLD: 17.6 % — HIGH (ref 10.3–14.5)
RBC # FLD: 18.3 % — HIGH (ref 10.3–14.5)
RBC # FLD: 18.4 % — HIGH (ref 10.3–14.5)
RBC # FLD: 18.6 % — HIGH (ref 10.3–14.5)
RBC # FLD: 18.6 % — HIGH (ref 10.3–14.5)
RBC # FLD: 18.7 % — HIGH (ref 10.3–14.5)
RBC # FLD: 18.8 % — HIGH (ref 10.3–14.5)
RBC # FLD: 19 % — HIGH (ref 10.3–14.5)
RBC # FLD: 19.4 % — HIGH (ref 10.3–14.5)
RBC # FLD: 19.7 % — HIGH (ref 10.3–14.5)
RBC # FLD: 19.7 % — HIGH (ref 10.3–14.5)
RBC CASTS # UR COMP ASSIST: ABNORMAL /HPF (ref 0–4)
SAO2 % BLDA: 77 % — LOW (ref 92–96)
SAO2 % BLDA: 86 % — LOW (ref 92–96)
SAO2 % BLDA: 94 % — SIGNIFICANT CHANGE UP (ref 92–96)
SAO2 % BLDA: 95 % — SIGNIFICANT CHANGE UP (ref 92–96)
SAO2 % BLDA: 95 % — SIGNIFICANT CHANGE UP (ref 92–96)
SAO2 % BLDA: 97 % — HIGH (ref 92–96)
SAO2 % BLDA: 98 % — HIGH (ref 92–96)
SAO2 % BLDA: 98 % — HIGH (ref 92–96)
SARS-COV-2 RNA SPEC QL NAA+PROBE: DETECTED
SARS-COV-2 RNA SPEC QL NAA+PROBE: DETECTED
SODIUM SERPL-SCNC: 139 MMOL/L — SIGNIFICANT CHANGE UP (ref 135–145)
SODIUM SERPL-SCNC: 140 MMOL/L — SIGNIFICANT CHANGE UP (ref 135–145)
SODIUM SERPL-SCNC: 141 MMOL/L — SIGNIFICANT CHANGE UP (ref 135–145)
SODIUM SERPL-SCNC: 142 MMOL/L — SIGNIFICANT CHANGE UP (ref 135–145)
SODIUM SERPL-SCNC: 144 MMOL/L — SIGNIFICANT CHANGE UP (ref 135–145)
SODIUM SERPL-SCNC: 145 MMOL/L — SIGNIFICANT CHANGE UP (ref 135–145)
SODIUM SERPL-SCNC: 146 MMOL/L — HIGH (ref 135–145)
SODIUM SERPL-SCNC: 147 MMOL/L — HIGH (ref 135–145)
SP GR SPEC: 1.02 — SIGNIFICANT CHANGE UP (ref 1.01–1.02)
SPECIMEN SOURCE: SIGNIFICANT CHANGE UP
TOTAL CHOLESTEROL/HDL RATIO MEASUREMENT: 5.3 RATIO — SIGNIFICANT CHANGE UP (ref 3.4–9.6)
TOTAL CHOLESTEROL/HDL RATIO MEASUREMENT: 5.6 RATIO — SIGNIFICANT CHANGE UP (ref 3.4–9.6)
TRIGL SERPL-MCNC: 209 MG/DL — HIGH (ref 10–149)
TRIGL SERPL-MCNC: 283 MG/DL — HIGH (ref 10–149)
TRIGL SERPL-MCNC: 300 MG/DL — HIGH (ref 10–149)
TROPONIN I SERPL-MCNC: 0.14 NG/ML — HIGH (ref 0.01–0.04)
TROPONIN I SERPL-MCNC: 0.18 NG/ML — HIGH (ref 0.01–0.04)
TROPONIN I SERPL-MCNC: 0.28 NG/ML — HIGH (ref 0.01–0.04)
TROPONIN I SERPL-MCNC: 0.3 NG/ML — HIGH (ref 0.01–0.04)
UROBILINOGEN FLD QL: NEGATIVE MG/DL — SIGNIFICANT CHANGE UP
WBC # BLD: 12.34 K/UL — HIGH (ref 3.8–10.5)
WBC # BLD: 12.93 K/UL — HIGH (ref 3.8–10.5)
WBC # BLD: 14.76 K/UL — HIGH (ref 3.8–10.5)
WBC # BLD: 15.69 K/UL — HIGH (ref 3.8–10.5)
WBC # BLD: 16.01 K/UL — HIGH (ref 3.8–10.5)
WBC # BLD: 16.15 K/UL — HIGH (ref 3.8–10.5)
WBC # BLD: 17.88 K/UL — HIGH (ref 3.8–10.5)
WBC # BLD: 18.87 K/UL — HIGH (ref 3.8–10.5)
WBC # BLD: 21.09 K/UL — HIGH (ref 3.8–10.5)
WBC # BLD: 21.17 K/UL — HIGH (ref 3.8–10.5)
WBC # BLD: 8.58 K/UL — SIGNIFICANT CHANGE UP (ref 3.8–10.5)
WBC # FLD AUTO: 12.34 K/UL — HIGH (ref 3.8–10.5)
WBC # FLD AUTO: 12.93 K/UL — HIGH (ref 3.8–10.5)
WBC # FLD AUTO: 14.76 K/UL — HIGH (ref 3.8–10.5)
WBC # FLD AUTO: 15.69 K/UL — HIGH (ref 3.8–10.5)
WBC # FLD AUTO: 16.01 K/UL — HIGH (ref 3.8–10.5)
WBC # FLD AUTO: 16.15 K/UL — HIGH (ref 3.8–10.5)
WBC # FLD AUTO: 17.88 K/UL — HIGH (ref 3.8–10.5)
WBC # FLD AUTO: 18.87 K/UL — HIGH (ref 3.8–10.5)
WBC # FLD AUTO: 21.09 K/UL — HIGH (ref 3.8–10.5)
WBC # FLD AUTO: 21.17 K/UL — HIGH (ref 3.8–10.5)
WBC # FLD AUTO: 8.58 K/UL — SIGNIFICANT CHANGE UP (ref 3.8–10.5)
WBC UR QL: ABNORMAL

## 2020-01-01 PROCEDURE — 99291 CRITICAL CARE FIRST HOUR: CPT

## 2020-01-01 PROCEDURE — 71045 X-RAY EXAM CHEST 1 VIEW: CPT | Mod: 26

## 2020-01-01 PROCEDURE — 99291 CRITICAL CARE FIRST HOUR: CPT | Mod: 25

## 2020-01-01 PROCEDURE — 31500 INSERT EMERGENCY AIRWAY: CPT

## 2020-01-01 PROCEDURE — 71045 X-RAY EXAM CHEST 1 VIEW: CPT | Mod: 26,76,77

## 2020-01-01 PROCEDURE — 93010 ELECTROCARDIOGRAM REPORT: CPT

## 2020-01-01 PROCEDURE — 36556 INSERT NON-TUNNEL CV CATH: CPT

## 2020-01-01 PROCEDURE — 74019 RADEX ABDOMEN 2 VIEWS: CPT | Mod: 26

## 2020-01-01 PROCEDURE — 76937 US GUIDE VASCULAR ACCESS: CPT | Mod: 26

## 2020-01-01 RX ORDER — GLUCAGON INJECTION, SOLUTION 0.5 MG/.1ML
1 INJECTION, SOLUTION SUBCUTANEOUS ONCE
Refills: 0 | Status: DISCONTINUED | OUTPATIENT
Start: 2020-01-01 | End: 2020-01-01

## 2020-01-01 RX ORDER — INSULIN GLARGINE 100 [IU]/ML
5 INJECTION, SOLUTION SUBCUTANEOUS ONCE
Refills: 0 | Status: COMPLETED | OUTPATIENT
Start: 2020-01-01 | End: 2020-01-01

## 2020-01-01 RX ORDER — DEXTROSE 50 % IN WATER 50 %
25 SYRINGE (ML) INTRAVENOUS ONCE
Refills: 0 | Status: DISCONTINUED | OUTPATIENT
Start: 2020-01-01 | End: 2020-01-01

## 2020-01-01 RX ORDER — SENNA PLUS 8.6 MG/1
2 TABLET ORAL AT BEDTIME
Refills: 0 | Status: DISCONTINUED | OUTPATIENT
Start: 2020-01-01 | End: 2020-01-01

## 2020-01-01 RX ORDER — CALCIUM ACETATE 667 MG
2001 TABLET ORAL EVERY 8 HOURS
Refills: 0 | Status: DISCONTINUED | OUTPATIENT
Start: 2020-01-01 | End: 2020-01-01

## 2020-01-01 RX ORDER — SODIUM CHLORIDE 9 MG/ML
2000 INJECTION INTRAMUSCULAR; INTRAVENOUS; SUBCUTANEOUS ONCE
Refills: 0 | Status: DISCONTINUED | OUTPATIENT
Start: 2020-01-01 | End: 2020-01-01

## 2020-01-01 RX ORDER — DEXTROSE 50 % IN WATER 50 %
15 SYRINGE (ML) INTRAVENOUS ONCE
Refills: 0 | Status: DISCONTINUED | OUTPATIENT
Start: 2020-01-01 | End: 2020-01-01

## 2020-01-01 RX ORDER — ANAKINRA 100MG/0.67
SYRINGE (ML) SUBCUTANEOUS
Refills: 0 | Status: DISCONTINUED | OUTPATIENT
Start: 2020-01-01 | End: 2020-01-01

## 2020-01-01 RX ORDER — DEXTROSE 50 % IN WATER 50 %
12.5 SYRINGE (ML) INTRAVENOUS ONCE
Refills: 0 | Status: DISCONTINUED | OUTPATIENT
Start: 2020-01-01 | End: 2020-01-01

## 2020-01-01 RX ORDER — FUROSEMIDE 40 MG
30 TABLET ORAL
Qty: 500 | Refills: 0 | Status: DISCONTINUED | OUTPATIENT
Start: 2020-01-01 | End: 2020-01-01

## 2020-01-01 RX ORDER — INSULIN LISPRO 100/ML
VIAL (ML) SUBCUTANEOUS
Refills: 0 | Status: DISCONTINUED | OUTPATIENT
Start: 2020-01-01 | End: 2020-01-01

## 2020-01-01 RX ORDER — FENTANYL CITRATE 50 UG/ML
100 INJECTION INTRAVENOUS ONCE
Refills: 0 | Status: DISCONTINUED | OUTPATIENT
Start: 2020-01-01 | End: 2020-01-01

## 2020-01-01 RX ORDER — CHLORHEXIDINE GLUCONATE 213 G/1000ML
15 SOLUTION TOPICAL EVERY 12 HOURS
Refills: 0 | Status: DISCONTINUED | OUTPATIENT
Start: 2020-01-01 | End: 2020-01-01

## 2020-01-01 RX ORDER — VANCOMYCIN HCL 1 G
1000 VIAL (EA) INTRAVENOUS ONCE
Refills: 0 | Status: COMPLETED | OUTPATIENT
Start: 2020-01-01 | End: 2020-01-01

## 2020-01-01 RX ORDER — SODIUM CHLORIDE 9 MG/ML
1000 INJECTION, SOLUTION INTRAVENOUS
Refills: 0 | Status: DISCONTINUED | OUTPATIENT
Start: 2020-01-01 | End: 2020-01-01

## 2020-01-01 RX ORDER — PANTOPRAZOLE SODIUM 20 MG/1
40 TABLET, DELAYED RELEASE ORAL DAILY
Refills: 0 | Status: DISCONTINUED | OUTPATIENT
Start: 2020-01-01 | End: 2020-01-01

## 2020-01-01 RX ORDER — NOREPINEPHRINE BITARTRATE/D5W 8 MG/250ML
0.05 PLASTIC BAG, INJECTION (ML) INTRAVENOUS
Qty: 16 | Refills: 0 | Status: DISCONTINUED | OUTPATIENT
Start: 2020-01-01 | End: 2020-01-01

## 2020-01-01 RX ORDER — INSULIN LISPRO 100/ML
VIAL (ML) SUBCUTANEOUS EVERY 6 HOURS
Refills: 0 | Status: DISCONTINUED | OUTPATIENT
Start: 2020-01-01 | End: 2020-01-01

## 2020-01-01 RX ORDER — DEXTROSE 50 % IN WATER 50 %
50 SYRINGE (ML) INTRAVENOUS ONCE
Refills: 0 | Status: COMPLETED | OUTPATIENT
Start: 2020-01-01 | End: 2020-01-01

## 2020-01-01 RX ORDER — MAGNESIUM SULFATE 500 MG/ML
2 VIAL (ML) INJECTION ONCE
Refills: 0 | Status: COMPLETED | OUTPATIENT
Start: 2020-01-01 | End: 2020-01-01

## 2020-01-01 RX ORDER — ROCURONIUM BROMIDE 10 MG/ML
50 VIAL (ML) INTRAVENOUS ONCE
Refills: 0 | Status: COMPLETED | OUTPATIENT
Start: 2020-01-01 | End: 2020-01-01

## 2020-01-01 RX ORDER — ASPIRIN/CALCIUM CARB/MAGNESIUM 324 MG
325 TABLET ORAL DAILY
Refills: 0 | Status: DISCONTINUED | OUTPATIENT
Start: 2020-01-01 | End: 2020-01-01

## 2020-01-01 RX ORDER — INSULIN HUMAN 100 [IU]/ML
10 INJECTION, SOLUTION SUBCUTANEOUS ONCE
Refills: 0 | Status: COMPLETED | OUTPATIENT
Start: 2020-01-01 | End: 2020-01-01

## 2020-01-01 RX ORDER — BUMETANIDE 0.25 MG/ML
1 INJECTION INTRAMUSCULAR; INTRAVENOUS ONCE
Refills: 0 | Status: COMPLETED | OUTPATIENT
Start: 2020-01-01 | End: 2020-01-01

## 2020-01-01 RX ORDER — PROPOFOL 10 MG/ML
10 INJECTION, EMULSION INTRAVENOUS
Qty: 1000 | Refills: 0 | Status: DISCONTINUED | OUTPATIENT
Start: 2020-01-01 | End: 2020-01-01

## 2020-01-01 RX ORDER — CISATRACURIUM BESYLATE 2 MG/ML
3 INJECTION INTRAVENOUS
Qty: 200 | Refills: 0 | Status: DISCONTINUED | OUTPATIENT
Start: 2020-01-01 | End: 2020-01-01

## 2020-01-01 RX ORDER — HEPARIN SODIUM 5000 [USP'U]/ML
6000 INJECTION INTRAVENOUS; SUBCUTANEOUS EVERY 6 HOURS
Refills: 0 | Status: DISCONTINUED | OUTPATIENT
Start: 2020-01-01 | End: 2020-01-01

## 2020-01-01 RX ORDER — POLYETHYLENE GLYCOL 3350 17 G/17G
17 POWDER, FOR SOLUTION ORAL DAILY
Refills: 0 | Status: DISCONTINUED | OUTPATIENT
Start: 2020-01-01 | End: 2020-01-01

## 2020-01-01 RX ORDER — ANAKINRA 100MG/0.67
100 SYRINGE (ML) SUBCUTANEOUS EVERY 24 HOURS
Refills: 0 | Status: COMPLETED | OUTPATIENT
Start: 2020-01-01 | End: 2020-01-01

## 2020-01-01 RX ORDER — ANAKINRA 100MG/0.67
100 SYRINGE (ML) SUBCUTANEOUS EVERY 12 HOURS
Refills: 0 | Status: COMPLETED | OUTPATIENT
Start: 2020-01-01 | End: 2020-01-01

## 2020-01-01 RX ORDER — VASOPRESSIN 20 [USP'U]/ML
0.04 INJECTION INTRAVENOUS
Qty: 50 | Refills: 0 | Status: DISCONTINUED | OUTPATIENT
Start: 2020-01-01 | End: 2020-01-01

## 2020-01-01 RX ORDER — PANTOPRAZOLE SODIUM 20 MG/1
40 TABLET, DELAYED RELEASE ORAL ONCE
Refills: 0 | Status: COMPLETED | OUTPATIENT
Start: 2020-01-01 | End: 2020-01-01

## 2020-01-01 RX ORDER — FUROSEMIDE 40 MG
120 TABLET ORAL ONCE
Refills: 0 | Status: DISCONTINUED | OUTPATIENT
Start: 2020-01-01 | End: 2020-01-01

## 2020-01-01 RX ORDER — ACETAMINOPHEN 500 MG
650 TABLET ORAL ONCE
Refills: 0 | Status: COMPLETED | OUTPATIENT
Start: 2020-01-01 | End: 2020-01-01

## 2020-01-01 RX ORDER — HEPARIN SODIUM 5000 [USP'U]/ML
INJECTION INTRAVENOUS; SUBCUTANEOUS
Qty: 25000 | Refills: 0 | Status: DISCONTINUED | OUTPATIENT
Start: 2020-01-01 | End: 2020-01-01

## 2020-01-01 RX ORDER — ETOMIDATE 2 MG/ML
20 INJECTION INTRAVENOUS ONCE
Refills: 0 | Status: COMPLETED | OUTPATIENT
Start: 2020-01-01 | End: 2020-01-01

## 2020-01-01 RX ORDER — HEPARIN SODIUM 5000 [USP'U]/ML
1100 INJECTION INTRAVENOUS; SUBCUTANEOUS
Qty: 25000 | Refills: 0 | Status: DISCONTINUED | OUTPATIENT
Start: 2020-01-01 | End: 2020-01-01

## 2020-01-01 RX ORDER — SODIUM ZIRCONIUM CYCLOSILICATE 10 G/10G
10 POWDER, FOR SUSPENSION ORAL EVERY 8 HOURS
Refills: 0 | Status: COMPLETED | OUTPATIENT
Start: 2020-01-01 | End: 2020-01-01

## 2020-01-01 RX ORDER — HEPARIN SODIUM 5000 [USP'U]/ML
5000 INJECTION INTRAVENOUS; SUBCUTANEOUS EVERY 12 HOURS
Refills: 0 | Status: DISCONTINUED | OUTPATIENT
Start: 2020-01-01 | End: 2020-01-01

## 2020-01-01 RX ORDER — SUCCINYLCHOLINE CHLORIDE 100 MG/5ML
200 SYRINGE (ML) INTRAVENOUS ONCE
Refills: 0 | Status: COMPLETED | OUTPATIENT
Start: 2020-01-01 | End: 2020-01-01

## 2020-01-01 RX ORDER — FUROSEMIDE 40 MG
120 TABLET ORAL ONCE
Refills: 0 | Status: COMPLETED | OUTPATIENT
Start: 2020-01-01 | End: 2020-01-01

## 2020-01-01 RX ORDER — CHLORHEXIDINE GLUCONATE 213 G/1000ML
1 SOLUTION TOPICAL
Refills: 0 | Status: DISCONTINUED | OUTPATIENT
Start: 2020-01-01 | End: 2020-01-01

## 2020-01-01 RX ORDER — SODIUM CHLORIDE 9 MG/ML
1000 INJECTION, SOLUTION INTRAVENOUS
Refills: 0 | Status: COMPLETED | OUTPATIENT
Start: 2020-01-01 | End: 2020-01-01

## 2020-01-01 RX ORDER — MIDODRINE HYDROCHLORIDE 2.5 MG/1
20 TABLET ORAL EVERY 8 HOURS
Refills: 0 | Status: DISCONTINUED | OUTPATIENT
Start: 2020-01-01 | End: 2020-01-01

## 2020-01-01 RX ORDER — FUROSEMIDE 40 MG
80 TABLET ORAL ONCE
Refills: 0 | Status: COMPLETED | OUTPATIENT
Start: 2020-01-01 | End: 2020-01-01

## 2020-01-01 RX ORDER — HEPARIN SODIUM 5000 [USP'U]/ML
1200 INJECTION INTRAVENOUS; SUBCUTANEOUS
Qty: 25000 | Refills: 0 | Status: DISCONTINUED | OUTPATIENT
Start: 2020-01-01 | End: 2020-01-01

## 2020-01-01 RX ORDER — HEPARIN SODIUM 5000 [USP'U]/ML
5000 INJECTION INTRAVENOUS; SUBCUTANEOUS ONCE
Refills: 0 | Status: DISCONTINUED | OUTPATIENT
Start: 2020-01-01 | End: 2020-01-01

## 2020-01-01 RX ORDER — NOREPINEPHRINE BITARTRATE/D5W 8 MG/250ML
0.05 PLASTIC BAG, INJECTION (ML) INTRAVENOUS
Qty: 8 | Refills: 0 | Status: DISCONTINUED | OUTPATIENT
Start: 2020-01-01 | End: 2020-01-01

## 2020-01-01 RX ORDER — ATORVASTATIN CALCIUM 80 MG/1
80 TABLET, FILM COATED ORAL AT BEDTIME
Refills: 0 | Status: DISCONTINUED | OUTPATIENT
Start: 2020-01-01 | End: 2020-01-01

## 2020-01-01 RX ORDER — SODIUM CHLORIDE 9 MG/ML
2000 INJECTION INTRAMUSCULAR; INTRAVENOUS; SUBCUTANEOUS ONCE
Refills: 0 | Status: COMPLETED | OUTPATIENT
Start: 2020-01-01 | End: 2020-01-01

## 2020-01-01 RX ORDER — HEPARIN SODIUM 5000 [USP'U]/ML
7500 INJECTION INTRAVENOUS; SUBCUTANEOUS EVERY 8 HOURS
Refills: 0 | Status: DISCONTINUED | OUTPATIENT
Start: 2020-01-01 | End: 2020-01-01

## 2020-01-01 RX ORDER — FENTANYL CITRATE 50 UG/ML
0.5 INJECTION INTRAVENOUS
Qty: 2500 | Refills: 0 | Status: DISCONTINUED | OUTPATIENT
Start: 2020-01-01 | End: 2020-01-01

## 2020-01-01 RX ORDER — INSULIN GLARGINE 100 [IU]/ML
5 INJECTION, SOLUTION SUBCUTANEOUS AT BEDTIME
Refills: 0 | Status: DISCONTINUED | OUTPATIENT
Start: 2020-01-01 | End: 2020-01-01

## 2020-01-01 RX ORDER — ANAKINRA 100MG/0.67
100 SYRINGE (ML) SUBCUTANEOUS
Refills: 0 | Status: DISCONTINUED | OUTPATIENT
Start: 2020-04-22 | End: 2020-01-01

## 2020-01-01 RX ADMIN — VASOPRESSIN 2.4 UNIT(S)/MIN: 20 INJECTION INTRAVENOUS at 12:29

## 2020-01-01 RX ADMIN — Medication 5.57 MICROGRAM(S)/KG/MIN: at 20:30

## 2020-01-01 RX ADMIN — Medication 2001 MILLIGRAM(S): at 07:02

## 2020-01-01 RX ADMIN — CHLORHEXIDINE GLUCONATE 15 MILLILITER(S): 213 SOLUTION TOPICAL at 05:00

## 2020-01-01 RX ADMIN — PROPOFOL 6.8 MICROGRAM(S)/KG/MIN: 10 INJECTION, EMULSION INTRAVENOUS at 02:41

## 2020-01-01 RX ADMIN — MIDODRINE HYDROCHLORIDE 20 MILLIGRAM(S): 2.5 TABLET ORAL at 05:03

## 2020-01-01 RX ADMIN — Medication 250 MILLIGRAM(S): at 00:58

## 2020-01-01 RX ADMIN — Medication 3: at 11:42

## 2020-01-01 RX ADMIN — HEPARIN SODIUM 800 UNIT(S)/HR: 5000 INJECTION INTRAVENOUS; SUBCUTANEOUS at 09:22

## 2020-01-01 RX ADMIN — PROPOFOL 6.8 MICROGRAM(S)/KG/MIN: 10 INJECTION, EMULSION INTRAVENOUS at 09:58

## 2020-01-01 RX ADMIN — Medication 80 MILLIGRAM(S): at 17:24

## 2020-01-01 RX ADMIN — Medication 5.57 MICROGRAM(S)/KG/MIN: at 13:49

## 2020-01-01 RX ADMIN — CHLORHEXIDINE GLUCONATE 1 APPLICATION(S): 213 SOLUTION TOPICAL at 05:01

## 2020-01-01 RX ADMIN — HEPARIN SODIUM 1200 UNIT(S)/HR: 5000 INJECTION INTRAVENOUS; SUBCUTANEOUS at 19:00

## 2020-01-01 RX ADMIN — Medication 5.57 MICROGRAM(S)/KG/MIN: at 08:49

## 2020-01-01 RX ADMIN — PROPOFOL 6.8 MICROGRAM(S)/KG/MIN: 10 INJECTION, EMULSION INTRAVENOUS at 03:13

## 2020-01-01 RX ADMIN — PROPOFOL 6.8 MICROGRAM(S)/KG/MIN: 10 INJECTION, EMULSION INTRAVENOUS at 12:54

## 2020-01-01 RX ADMIN — FENTANYL CITRATE 5.67 MICROGRAM(S)/KG/HR: 50 INJECTION INTRAVENOUS at 08:49

## 2020-01-01 RX ADMIN — Medication 100 MILLIGRAM(S): at 17:35

## 2020-01-01 RX ADMIN — Medication 5 MG/HR: at 21:52

## 2020-01-01 RX ADMIN — Medication 4: at 04:00

## 2020-01-01 RX ADMIN — Medication 15 MG/HR: at 13:30

## 2020-01-01 RX ADMIN — PROPOFOL 6.8 MICROGRAM(S)/KG/MIN: 10 INJECTION, EMULSION INTRAVENOUS at 19:39

## 2020-01-01 RX ADMIN — Medication 100 MILLIGRAM(S): at 17:37

## 2020-01-01 RX ADMIN — INSULIN GLARGINE 5 UNIT(S): 100 INJECTION, SOLUTION SUBCUTANEOUS at 21:19

## 2020-01-01 RX ADMIN — PANTOPRAZOLE SODIUM 40 MILLIGRAM(S): 20 TABLET, DELAYED RELEASE ORAL at 14:18

## 2020-01-01 RX ADMIN — Medication 2: at 06:14

## 2020-01-01 RX ADMIN — Medication 50 MILLILITER(S): at 12:52

## 2020-01-01 RX ADMIN — PROPOFOL 6.8 MICROGRAM(S)/KG/MIN: 10 INJECTION, EMULSION INTRAVENOUS at 00:00

## 2020-01-01 RX ADMIN — CHLORHEXIDINE GLUCONATE 1 APPLICATION(S): 213 SOLUTION TOPICAL at 08:57

## 2020-01-01 RX ADMIN — Medication 50 MILLIGRAM(S): at 12:30

## 2020-01-01 RX ADMIN — PROPOFOL 6.8 MICROGRAM(S)/KG/MIN: 10 INJECTION, EMULSION INTRAVENOUS at 19:00

## 2020-01-01 RX ADMIN — HEPARIN SODIUM 12 UNIT(S)/HR: 5000 INJECTION INTRAVENOUS; SUBCUTANEOUS at 08:37

## 2020-01-01 RX ADMIN — HEPARIN SODIUM 12 UNIT(S)/HR: 5000 INJECTION INTRAVENOUS; SUBCUTANEOUS at 11:59

## 2020-01-01 RX ADMIN — PANTOPRAZOLE SODIUM 40 MILLIGRAM(S): 20 TABLET, DELAYED RELEASE ORAL at 22:28

## 2020-01-01 RX ADMIN — PROPOFOL 6.8 MICROGRAM(S)/KG/MIN: 10 INJECTION, EMULSION INTRAVENOUS at 13:34

## 2020-01-01 RX ADMIN — Medication 2: at 05:29

## 2020-01-01 RX ADMIN — BUMETANIDE 1 MILLIGRAM(S): 0.25 INJECTION INTRAMUSCULAR; INTRAVENOUS at 03:47

## 2020-01-01 RX ADMIN — Medication 4: at 17:00

## 2020-01-01 RX ADMIN — SODIUM ZIRCONIUM CYCLOSILICATE 10 GRAM(S): 10 POWDER, FOR SUSPENSION ORAL at 21:59

## 2020-01-01 RX ADMIN — VASOPRESSIN 2.4 UNIT(S)/MIN: 20 INJECTION INTRAVENOUS at 13:28

## 2020-01-01 RX ADMIN — CHLORHEXIDINE GLUCONATE 1 APPLICATION(S): 213 SOLUTION TOPICAL at 06:18

## 2020-01-01 RX ADMIN — INSULIN GLARGINE 5 UNIT(S): 100 INJECTION, SOLUTION SUBCUTANEOUS at 21:31

## 2020-01-01 RX ADMIN — Medication 100 MILLIGRAM(S): at 05:02

## 2020-01-01 RX ADMIN — Medication 4: at 12:29

## 2020-01-01 RX ADMIN — HEPARIN SODIUM 800 UNIT(S)/HR: 5000 INJECTION INTRAVENOUS; SUBCUTANEOUS at 23:26

## 2020-01-01 RX ADMIN — HEPARIN SODIUM 12 UNIT(S)/HR: 5000 INJECTION INTRAVENOUS; SUBCUTANEOUS at 07:00

## 2020-01-01 RX ADMIN — CHLORHEXIDINE GLUCONATE 15 MILLILITER(S): 213 SOLUTION TOPICAL at 05:02

## 2020-01-01 RX ADMIN — SODIUM ZIRCONIUM CYCLOSILICATE 10 GRAM(S): 10 POWDER, FOR SUSPENSION ORAL at 14:29

## 2020-01-01 RX ADMIN — PANTOPRAZOLE SODIUM 40 MILLIGRAM(S): 20 TABLET, DELAYED RELEASE ORAL at 13:26

## 2020-01-01 RX ADMIN — Medication 40 MILLIGRAM(S): at 04:44

## 2020-01-01 RX ADMIN — Medication 6: at 05:01

## 2020-01-01 RX ADMIN — HEPARIN SODIUM 0 UNIT(S)/HR: 5000 INJECTION INTRAVENOUS; SUBCUTANEOUS at 00:48

## 2020-01-01 RX ADMIN — Medication 80 MILLIGRAM(S): at 17:18

## 2020-01-01 RX ADMIN — Medication 40 MILLIGRAM(S): at 21:28

## 2020-01-01 RX ADMIN — Medication 2: at 00:03

## 2020-01-01 RX ADMIN — INSULIN GLARGINE 5 UNIT(S): 100 INJECTION, SOLUTION SUBCUTANEOUS at 22:19

## 2020-01-01 RX ADMIN — Medication 200 MILLIGRAM(S): at 18:00

## 2020-01-01 RX ADMIN — VASOPRESSIN 2.4 UNIT(S)/MIN: 20 INJECTION INTRAVENOUS at 08:38

## 2020-01-01 RX ADMIN — Medication 5.57 MICROGRAM(S)/KG/MIN: at 17:35

## 2020-01-01 RX ADMIN — Medication 100 MILLIGRAM(S): at 18:06

## 2020-01-01 RX ADMIN — HEPARIN SODIUM 1000 UNIT(S)/HR: 5000 INJECTION INTRAVENOUS; SUBCUTANEOUS at 10:26

## 2020-01-01 RX ADMIN — FENTANYL CITRATE 5.67 MICROGRAM(S)/KG/HR: 50 INJECTION INTRAVENOUS at 13:49

## 2020-01-01 RX ADMIN — CHLORHEXIDINE GLUCONATE 15 MILLILITER(S): 213 SOLUTION TOPICAL at 05:28

## 2020-01-01 RX ADMIN — HEPARIN SODIUM 800 UNIT(S)/HR: 5000 INJECTION INTRAVENOUS; SUBCUTANEOUS at 06:04

## 2020-01-01 RX ADMIN — PROPOFOL 6.8 MICROGRAM(S)/KG/MIN: 10 INJECTION, EMULSION INTRAVENOUS at 09:05

## 2020-01-01 RX ADMIN — CHLORHEXIDINE GLUCONATE 15 MILLILITER(S): 213 SOLUTION TOPICAL at 05:25

## 2020-01-01 RX ADMIN — FENTANYL CITRATE 5.67 MICROGRAM(S)/KG/HR: 50 INJECTION INTRAVENOUS at 19:40

## 2020-01-01 RX ADMIN — HEPARIN SODIUM 600 UNIT(S)/HR: 5000 INJECTION INTRAVENOUS; SUBCUTANEOUS at 16:03

## 2020-01-01 RX ADMIN — Medication 3: at 05:13

## 2020-01-01 RX ADMIN — HEPARIN SODIUM 800 UNIT(S)/HR: 5000 INJECTION INTRAVENOUS; SUBCUTANEOUS at 13:27

## 2020-01-01 RX ADMIN — Medication 80 MILLIGRAM(S): at 05:27

## 2020-01-01 RX ADMIN — CHLORHEXIDINE GLUCONATE 1 APPLICATION(S): 213 SOLUTION TOPICAL at 06:19

## 2020-01-01 RX ADMIN — Medication 4: at 11:37

## 2020-01-01 RX ADMIN — Medication 6: at 17:23

## 2020-01-01 RX ADMIN — HEPARIN SODIUM 1200 UNIT(S)/HR: 5000 INJECTION INTRAVENOUS; SUBCUTANEOUS at 08:47

## 2020-01-01 RX ADMIN — Medication 4: at 17:25

## 2020-01-01 RX ADMIN — Medication 4: at 12:33

## 2020-01-01 RX ADMIN — Medication 2001 MILLIGRAM(S): at 21:30

## 2020-01-01 RX ADMIN — Medication 40 MILLIGRAM(S): at 21:17

## 2020-01-01 RX ADMIN — PANTOPRAZOLE SODIUM 40 MILLIGRAM(S): 20 TABLET, DELAYED RELEASE ORAL at 11:41

## 2020-01-01 RX ADMIN — HEPARIN SODIUM 1200 UNIT(S)/HR: 5000 INJECTION INTRAVENOUS; SUBCUTANEOUS at 02:25

## 2020-01-01 RX ADMIN — VASOPRESSIN 2.4 UNIT(S)/MIN: 20 INJECTION INTRAVENOUS at 09:05

## 2020-01-01 RX ADMIN — Medication 2001 MILLIGRAM(S): at 05:28

## 2020-01-01 RX ADMIN — ETOMIDATE 20 MILLIGRAM(S): 2 INJECTION INTRAVENOUS at 18:00

## 2020-01-01 RX ADMIN — PROPOFOL 6.8 MICROGRAM(S)/KG/MIN: 10 INJECTION, EMULSION INTRAVENOUS at 08:37

## 2020-01-01 RX ADMIN — INSULIN GLARGINE 5 UNIT(S): 100 INJECTION, SOLUTION SUBCUTANEOUS at 21:13

## 2020-01-01 RX ADMIN — Medication 100 MILLIGRAM(S): at 05:08

## 2020-01-01 RX ADMIN — CHLORHEXIDINE GLUCONATE 15 MILLILITER(S): 213 SOLUTION TOPICAL at 18:02

## 2020-01-01 RX ADMIN — HEPARIN SODIUM 700 UNIT(S)/HR: 5000 INJECTION INTRAVENOUS; SUBCUTANEOUS at 18:25

## 2020-01-01 RX ADMIN — Medication 4: at 05:25

## 2020-01-01 RX ADMIN — Medication 100 MILLIGRAM(S): at 05:03

## 2020-01-01 RX ADMIN — VASOPRESSIN 2.4 UNIT(S)/MIN: 20 INJECTION INTRAVENOUS at 00:51

## 2020-01-01 RX ADMIN — FENTANYL CITRATE 5.67 MICROGRAM(S)/KG/HR: 50 INJECTION INTRAVENOUS at 03:13

## 2020-01-01 RX ADMIN — Medication 2: at 07:03

## 2020-01-01 RX ADMIN — Medication 4: at 17:35

## 2020-01-01 RX ADMIN — PROPOFOL 6.8 MICROGRAM(S)/KG/MIN: 10 INJECTION, EMULSION INTRAVENOUS at 21:27

## 2020-01-01 RX ADMIN — SODIUM ZIRCONIUM CYCLOSILICATE 10 GRAM(S): 10 POWDER, FOR SUSPENSION ORAL at 21:17

## 2020-01-01 RX ADMIN — FENTANYL CITRATE 5.67 MICROGRAM(S)/KG/HR: 50 INJECTION INTRAVENOUS at 23:08

## 2020-01-01 RX ADMIN — SODIUM CHLORIDE 2000 MILLILITER(S): 9 INJECTION INTRAMUSCULAR; INTRAVENOUS; SUBCUTANEOUS at 19:37

## 2020-01-01 RX ADMIN — Medication 4: at 23:26

## 2020-01-01 RX ADMIN — PANTOPRAZOLE SODIUM 40 MILLIGRAM(S): 20 TABLET, DELAYED RELEASE ORAL at 13:44

## 2020-01-01 RX ADMIN — FENTANYL CITRATE 5.67 MICROGRAM(S)/KG/HR: 50 INJECTION INTRAVENOUS at 01:50

## 2020-01-01 RX ADMIN — Medication 80 MILLIGRAM(S): at 18:02

## 2020-01-01 RX ADMIN — CHLORHEXIDINE GLUCONATE 1 APPLICATION(S): 213 SOLUTION TOPICAL at 07:27

## 2020-01-01 RX ADMIN — CHLORHEXIDINE GLUCONATE 15 MILLILITER(S): 213 SOLUTION TOPICAL at 17:20

## 2020-01-01 RX ADMIN — PANTOPRAZOLE SODIUM 40 MILLIGRAM(S): 20 TABLET, DELAYED RELEASE ORAL at 21:29

## 2020-01-01 RX ADMIN — CHLORHEXIDINE GLUCONATE 15 MILLILITER(S): 213 SOLUTION TOPICAL at 16:59

## 2020-01-01 RX ADMIN — Medication 124 MILLIGRAM(S): at 10:14

## 2020-01-01 RX ADMIN — Medication 325 MILLIGRAM(S): at 12:28

## 2020-01-01 RX ADMIN — MIDODRINE HYDROCHLORIDE 20 MILLIGRAM(S): 2.5 TABLET ORAL at 11:01

## 2020-01-01 RX ADMIN — Medication 2001 MILLIGRAM(S): at 13:31

## 2020-01-01 RX ADMIN — Medication 40 MILLIGRAM(S): at 14:00

## 2020-01-01 RX ADMIN — Medication 80 MILLIGRAM(S): at 13:32

## 2020-01-01 RX ADMIN — Medication 325 MILLIGRAM(S): at 11:49

## 2020-01-01 RX ADMIN — HEPARIN SODIUM 7500 UNIT(S): 5000 INJECTION INTRAVENOUS; SUBCUTANEOUS at 00:30

## 2020-01-01 RX ADMIN — CHLORHEXIDINE GLUCONATE 15 MILLILITER(S): 213 SOLUTION TOPICAL at 17:35

## 2020-01-01 RX ADMIN — Medication 325 MILLIGRAM(S): at 11:01

## 2020-01-01 RX ADMIN — PROPOFOL 6.8 MICROGRAM(S)/KG/MIN: 10 INJECTION, EMULSION INTRAVENOUS at 07:43

## 2020-01-01 RX ADMIN — FENTANYL CITRATE 5.67 MICROGRAM(S)/KG/HR: 50 INJECTION INTRAVENOUS at 02:41

## 2020-01-01 RX ADMIN — ATORVASTATIN CALCIUM 80 MILLIGRAM(S): 80 TABLET, FILM COATED ORAL at 22:00

## 2020-01-01 RX ADMIN — Medication 40 MILLIGRAM(S): at 21:48

## 2020-01-01 RX ADMIN — Medication 2001 MILLIGRAM(S): at 22:59

## 2020-01-01 RX ADMIN — Medication 6: at 11:42

## 2020-01-01 RX ADMIN — Medication 325 MILLIGRAM(S): at 11:41

## 2020-01-01 RX ADMIN — Medication 80 MILLIGRAM(S): at 05:01

## 2020-01-01 RX ADMIN — Medication 4: at 23:23

## 2020-01-01 RX ADMIN — Medication 80 MILLIGRAM(S): at 05:03

## 2020-01-01 RX ADMIN — PROPOFOL 6.8 MICROGRAM(S)/KG/MIN: 10 INJECTION, EMULSION INTRAVENOUS at 13:26

## 2020-01-01 RX ADMIN — FENTANYL CITRATE 5.67 MICROGRAM(S)/KG/HR: 50 INJECTION INTRAVENOUS at 09:00

## 2020-01-01 RX ADMIN — INSULIN GLARGINE 5 UNIT(S): 100 INJECTION, SOLUTION SUBCUTANEOUS at 23:23

## 2020-01-01 RX ADMIN — FENTANYL CITRATE 5.94 MICROGRAM(S)/KG/HR: 50 INJECTION INTRAVENOUS at 11:53

## 2020-01-01 RX ADMIN — VASOPRESSIN 2.4 UNIT(S)/MIN: 20 INJECTION INTRAVENOUS at 10:14

## 2020-01-01 RX ADMIN — Medication 80 MILLIGRAM(S): at 21:17

## 2020-01-01 RX ADMIN — FENTANYL CITRATE 100 MICROGRAM(S): 50 INJECTION INTRAVENOUS at 21:47

## 2020-01-01 RX ADMIN — Medication 6: at 23:34

## 2020-01-01 RX ADMIN — FENTANYL CITRATE 5.67 MICROGRAM(S)/KG/HR: 50 INJECTION INTRAVENOUS at 20:38

## 2020-01-01 RX ADMIN — PANTOPRAZOLE SODIUM 40 MILLIGRAM(S): 20 TABLET, DELAYED RELEASE ORAL at 11:02

## 2020-01-01 RX ADMIN — PROPOFOL 6.8 MICROGRAM(S)/KG/MIN: 10 INJECTION, EMULSION INTRAVENOUS at 15:27

## 2020-01-01 RX ADMIN — Medication 40 MILLIGRAM(S): at 05:13

## 2020-01-01 RX ADMIN — MIDODRINE HYDROCHLORIDE 20 MILLIGRAM(S): 2.5 TABLET ORAL at 13:32

## 2020-01-01 RX ADMIN — Medication 6: at 18:25

## 2020-01-01 RX ADMIN — INSULIN HUMAN 10 UNIT(S): 100 INJECTION, SOLUTION SUBCUTANEOUS at 12:52

## 2020-01-01 RX ADMIN — Medication 40 MILLIGRAM(S): at 13:26

## 2020-01-01 RX ADMIN — Medication 40 MILLIGRAM(S): at 14:29

## 2020-01-01 RX ADMIN — Medication 5.57 MICROGRAM(S)/KG/MIN: at 06:06

## 2020-01-01 RX ADMIN — Medication 2001 MILLIGRAM(S): at 14:19

## 2020-01-01 RX ADMIN — Medication 100 MILLIGRAM(S): at 05:25

## 2020-01-01 RX ADMIN — Medication 2: at 22:20

## 2020-01-01 RX ADMIN — HEPARIN SODIUM 12 UNIT(S)/HR: 5000 INJECTION INTRAVENOUS; SUBCUTANEOUS at 19:11

## 2020-01-01 RX ADMIN — POLYETHYLENE GLYCOL 3350 17 GRAM(S): 17 POWDER, FOR SOLUTION ORAL at 11:50

## 2020-01-01 RX ADMIN — Medication 4: at 17:26

## 2020-01-01 RX ADMIN — Medication 40 MILLIGRAM(S): at 21:58

## 2020-01-01 RX ADMIN — FENTANYL CITRATE 5.67 MICROGRAM(S)/KG/HR: 50 INJECTION INTRAVENOUS at 08:37

## 2020-01-01 RX ADMIN — SODIUM ZIRCONIUM CYCLOSILICATE 10 GRAM(S): 10 POWDER, FOR SUSPENSION ORAL at 05:14

## 2020-01-01 RX ADMIN — Medication 6: at 11:02

## 2020-01-01 RX ADMIN — FENTANYL CITRATE 5.67 MICROGRAM(S)/KG/HR: 50 INJECTION INTRAVENOUS at 09:05

## 2020-01-01 RX ADMIN — Medication 4: at 11:50

## 2020-01-01 RX ADMIN — CHLORHEXIDINE GLUCONATE 1 APPLICATION(S): 213 SOLUTION TOPICAL at 07:03

## 2020-01-01 RX ADMIN — CHLORHEXIDINE GLUCONATE 15 MILLILITER(S): 213 SOLUTION TOPICAL at 17:26

## 2020-01-01 RX ADMIN — Medication 650 MILLIGRAM(S): at 18:00

## 2020-01-01 RX ADMIN — CHLORHEXIDINE GLUCONATE 15 MILLILITER(S): 213 SOLUTION TOPICAL at 17:25

## 2020-01-01 RX ADMIN — Medication 4: at 23:21

## 2020-01-01 RX ADMIN — PROPOFOL 6.8 MICROGRAM(S)/KG/MIN: 10 INJECTION, EMULSION INTRAVENOUS at 09:49

## 2020-01-01 RX ADMIN — CHLORHEXIDINE GLUCONATE 15 MILLILITER(S): 213 SOLUTION TOPICAL at 07:01

## 2020-01-01 RX ADMIN — SODIUM ZIRCONIUM CYCLOSILICATE 10 GRAM(S): 10 POWDER, FOR SUSPENSION ORAL at 04:44

## 2020-01-01 RX ADMIN — HEPARIN SODIUM 400 UNIT(S)/HR: 5000 INJECTION INTRAVENOUS; SUBCUTANEOUS at 09:17

## 2020-01-01 RX ADMIN — SODIUM CHLORIDE 75 MILLILITER(S): 9 INJECTION, SOLUTION INTRAVENOUS at 12:54

## 2020-01-01 RX ADMIN — SODIUM ZIRCONIUM CYCLOSILICATE 10 GRAM(S): 10 POWDER, FOR SUSPENSION ORAL at 12:55

## 2020-01-01 RX ADMIN — HEPARIN SODIUM 7500 UNIT(S): 5000 INJECTION INTRAVENOUS; SUBCUTANEOUS at 05:43

## 2020-01-01 RX ADMIN — CISATRACURIUM BESYLATE 21.4 MICROGRAM(S)/KG/MIN: 2 INJECTION INTRAVENOUS at 03:31

## 2020-01-01 RX ADMIN — VASOPRESSIN 2.4 UNIT(S)/MIN: 20 INJECTION INTRAVENOUS at 08:49

## 2020-01-01 RX ADMIN — Medication 10 MG/HR: at 12:00

## 2020-01-01 RX ADMIN — Medication 125 MILLIGRAM(S): at 21:28

## 2020-01-01 RX ADMIN — INSULIN GLARGINE 5 UNIT(S): 100 INJECTION, SOLUTION SUBCUTANEOUS at 10:01

## 2020-01-01 RX ADMIN — CISATRACURIUM BESYLATE 21.4 MICROGRAM(S)/KG/MIN: 2 INJECTION INTRAVENOUS at 13:29

## 2020-01-01 RX ADMIN — CISATRACURIUM BESYLATE 21.4 MICROGRAM(S)/KG/MIN: 2 INJECTION INTRAVENOUS at 14:40

## 2020-01-01 RX ADMIN — Medication 100 MILLIGRAM(S): at 05:30

## 2020-01-01 RX ADMIN — PROPOFOL 6.8 MICROGRAM(S)/KG/MIN: 10 INJECTION, EMULSION INTRAVENOUS at 10:38

## 2020-01-01 RX ADMIN — Medication 10.6 MICROGRAM(S)/KG/MIN: at 22:27

## 2020-01-01 RX ADMIN — Medication 50 MILLIGRAM(S): at 14:25

## 2020-01-01 RX ADMIN — MIDODRINE HYDROCHLORIDE 20 MILLIGRAM(S): 2.5 TABLET ORAL at 13:44

## 2020-01-01 RX ADMIN — CHLORHEXIDINE GLUCONATE 15 MILLILITER(S): 213 SOLUTION TOPICAL at 05:04

## 2020-01-01 RX ADMIN — Medication 6: at 17:35

## 2020-01-01 RX ADMIN — PROPOFOL 6.8 MICROGRAM(S)/KG/MIN: 10 INJECTION, EMULSION INTRAVENOUS at 20:30

## 2020-01-01 RX ADMIN — Medication 100 MILLIGRAM(S): at 05:40

## 2020-01-01 RX ADMIN — FENTANYL CITRATE 5.67 MICROGRAM(S)/KG/HR: 50 INJECTION INTRAVENOUS at 23:36

## 2020-01-01 RX ADMIN — Medication 2: at 11:25

## 2020-01-01 RX ADMIN — INSULIN GLARGINE 5 UNIT(S): 100 INJECTION, SOLUTION SUBCUTANEOUS at 21:59

## 2020-01-01 RX ADMIN — PANTOPRAZOLE SODIUM 40 MILLIGRAM(S): 20 TABLET, DELAYED RELEASE ORAL at 14:24

## 2020-01-01 RX ADMIN — Medication 2: at 23:12

## 2020-01-01 RX ADMIN — PANTOPRAZOLE SODIUM 40 MILLIGRAM(S): 20 TABLET, DELAYED RELEASE ORAL at 11:49

## 2020-01-01 RX ADMIN — Medication 4: at 05:10

## 2020-01-01 RX ADMIN — Medication 40 MILLIGRAM(S): at 05:44

## 2020-01-01 RX ADMIN — Medication 10 MG/HR: at 08:37

## 2020-01-01 RX ADMIN — PROPOFOL 6.8 MICROGRAM(S)/KG/MIN: 10 INJECTION, EMULSION INTRAVENOUS at 08:49

## 2020-01-01 RX ADMIN — Medication 10 MILLIGRAM(S): at 11:25

## 2020-01-01 RX ADMIN — PROPOFOL 6.8 MICROGRAM(S)/KG/MIN: 10 INJECTION, EMULSION INTRAVENOUS at 18:01

## 2020-01-01 RX ADMIN — Medication 325 MILLIGRAM(S): at 11:25

## 2020-01-01 RX ADMIN — CHLORHEXIDINE GLUCONATE 15 MILLILITER(S): 213 SOLUTION TOPICAL at 05:14

## 2020-01-01 RX ADMIN — HEPARIN SODIUM 1000 UNIT(S)/HR: 5000 INJECTION INTRAVENOUS; SUBCUTANEOUS at 04:01

## 2020-01-01 RX ADMIN — MIDODRINE HYDROCHLORIDE 20 MILLIGRAM(S): 2.5 TABLET ORAL at 22:59

## 2020-01-01 RX ADMIN — Medication 10 MG/HR: at 09:05

## 2020-01-01 RX ADMIN — HEPARIN SODIUM 0 UNIT(S)/HR: 5000 INJECTION INTRAVENOUS; SUBCUTANEOUS at 17:06

## 2020-01-01 RX ADMIN — FENTANYL CITRATE 5.67 MICROGRAM(S)/KG/HR: 50 INJECTION INTRAVENOUS at 17:52

## 2020-01-01 RX ADMIN — Medication 5.57 MICROGRAM(S)/KG/MIN: at 13:26

## 2020-01-01 RX ADMIN — CHLORHEXIDINE GLUCONATE 1 APPLICATION(S): 213 SOLUTION TOPICAL at 08:21

## 2020-01-01 RX ADMIN — Medication 50 GRAM(S): at 21:28

## 2020-01-01 RX ADMIN — Medication 80 MILLIGRAM(S): at 09:48

## 2020-01-01 RX ADMIN — HEPARIN SODIUM 400 UNIT(S)/HR: 5000 INJECTION INTRAVENOUS; SUBCUTANEOUS at 02:14

## 2020-01-01 RX ADMIN — POLYETHYLENE GLYCOL 3350 17 GRAM(S): 17 POWDER, FOR SOLUTION ORAL at 11:25

## 2020-01-01 RX ADMIN — PANTOPRAZOLE SODIUM 40 MILLIGRAM(S): 20 TABLET, DELAYED RELEASE ORAL at 13:32

## 2020-04-13 NOTE — ED ADULT NURSE NOTE - NSIMPLEMENTINTERV_GEN_ALL_ED
Implemented All Fall with Harm Risk Interventions:  [place] to call system. Call bell, personal items and telephone within reach. Instruct patient to call for assistance. Room bathroom lighting operational. Non-slip footwear when patient is off stretcher. Physically safe environment: no spills, clutter or unnecessary equipment. Stretcher in lowest position, wheels locked, appropriate side rails in place. Provide visual cue, wrist band, yellow gown, etc. Monitor gait and stability. Monitor for mental status changes and reorient to person, place, and time. Review medications for side effects contributing to fall risk. Reinforce activity limits and safety measures with patient and family. Provide visual clues: red socks.

## 2020-04-13 NOTE — H&P ADULT - ATTENDING COMMENTS
Agree with above.  74M with acute Type 1 respiratory failure intubated.  R/O COVID  Admit to ICU for ventilatory support, possible COVID-19 pneumonia.    I have personally provided 35 minutes of critical care time.

## 2020-04-13 NOTE — H&P ADULT - ASSESSMENT
74 year old male with PMHx HTN HLD DM COPD CABG obese who presents to the ED with acute shortness of breath, intubated in the ED for respiratory failure. Rule out COVID. Patient to be admitted to the ICU when bed available.     Neuro:  -- Maintain RASS 0 to -1 with sedation. Continue Propofol. Consider adding other agents    Cardio:  -- No acute issues at this time. Continue with cardiac monitoring.  -- BP stable. Consider pressors for hypotension.     Resp:  -- Respiratory failure. PRVC. Suction endotracheal, Weaning protocol QAM    GI:   -- IV PPI     Renal:  -- Monitor BMP daily   -- Monitor UOP    Infectious Disease:  -- COVID pending    Heme:  -- SQH     Endocrine:  -- RISS     Disposition:  -- Admit to ICU

## 2020-04-13 NOTE — H&P ADULT - NSHPPHYSICALEXAM_GEN_ALL_CORE
ICU Vital Signs Last 24 Hrs  T(C): 38.6 (13 Apr 2020 17:35), Max: 38.6 (13 Apr 2020 17:35)  T(F): 101.4 (13 Apr 2020 17:35), Max: 101.4 (13 Apr 2020 17:35)  HR: 99 (13 Apr 2020 17:35) (99 - 99)  BP: 143/72 (13 Apr 2020 17:35) (143/72 - 143/72)  RR: 40 (13 Apr 2020 17:35) (40 - 40)  SpO2: 96% (13 Apr 2020 17:35) (96% - 96%)    PHYSICAL EXAM:  GENERAL: Lying in bed, intubated   HEAD:  Atraumatic, Normocephalic  EYES: PERRLA  NECK: Supple, obese, No JVD, Normal thyroid  NERVOUS SYSTEM:  Sedated with propofol  CHEST/LUNG: Clear to percussion bilaterally; No rales, rhonchi, wheezing, or rubs  HEART: Regular rate and rhythm; No murmurs, rubs, or gallops  ABDOMEN: Soft, Nontender, Nondistended; Bowel sounds present  EXTREMITIES:  1+ Peripheral Pulses, 1+ edema

## 2020-04-13 NOTE — ED PROVIDER NOTE - PROGRESS NOTE DETAILS
Pt is breathing at rate of 61 px 96 % on non breather. [id number] ICU PA is here eval and sts admit pt to Dr. Mclean.

## 2020-04-13 NOTE — ED PROVIDER NOTE - CONSTITUTIONAL, MLM
normal... Well appearing, awake, lethargic, oriented to person, + nasal flaring + shoulders retractions

## 2020-04-13 NOTE — ED PROVIDER NOTE - CARE PLAN
Principal Discharge DX:	Respiratory failure  Secondary Diagnosis:	Pneumonia  Secondary Diagnosis:	COPD exacerbation

## 2020-04-13 NOTE — H&P ADULT - HISTORY OF PRESENT ILLNESS
Patient is a 74 year old male with PMHx HTN HLD DM COPD CABG obese who presents to the ED with shortness of breath. As per ED provider patient presented with marked tachypnea. Satting 92% on NRB but tachypneic with RR of 60+. Patient intubated for impending respiratory failure. Writer unable to reach NOK for more collateral information.

## 2020-04-14 PROBLEM — E11.9 TYPE 2 DIABETES MELLITUS WITHOUT COMPLICATIONS: Chronic | Status: ACTIVE | Noted: 2019-01-01

## 2020-04-14 PROBLEM — J44.9 CHRONIC OBSTRUCTIVE PULMONARY DISEASE, UNSPECIFIED: Chronic | Status: ACTIVE | Noted: 2019-01-01

## 2020-04-14 PROBLEM — E78.5 HYPERLIPIDEMIA, UNSPECIFIED: Chronic | Status: ACTIVE | Noted: 2019-01-01

## 2020-04-14 PROBLEM — I10 ESSENTIAL (PRIMARY) HYPERTENSION: Chronic | Status: ACTIVE | Noted: 2019-01-01

## 2020-04-14 NOTE — ED ADULT NURSE REASSESSMENT NOTE - NS ED NURSE REASSESS COMMENT FT1
Since resuming care of Mr. Rhodes at 1930, the care team has intubated/sedated him, started a fem central line, started a NorEpi drip for pressure support, and inserted a 20G urinary cath. He is receiving Propofol and Fentanyl for continued sedation. Pt has not produced any urine since time of cath insertion. No other changes to note at this time.

## 2020-04-14 NOTE — PROVIDER CONTACT NOTE (CRITICAL VALUE NOTIFICATION) - TEST AND RESULT REPORTED:
blood culture preliminary result growth in anaerobic bottle gram positive cocci in cluster 4/13/2020

## 2020-04-14 NOTE — CONSULT NOTE ADULT - SUBJECTIVE AND OBJECTIVE BOX
[facility] NEPHROLOGY SERVICES, [facility]  NEPHROLOGY AND HYPERTENSION  [address]  [address]  [place] [address]  [phone]    MD JEET OLIVAREZ MD ANDREY GONCHARUK, MD MADHU KORRAPATI, MD YELENA ROSENBERG, MD BINNY KOSHY, MD CHRISTOPHER CAPUTO, MD EDWARD BOVER, MD      Information from chart:  "Patient is a 74y old  Male who presents with a chief complaint of Shortness of Breath (2020 18:40)    HPI:  Patient is a 74 year old male with PMHx HTN HLD DM COPD CABG obese who presents to the ED with shortness of breath. As per ED provider patient presented with marked tachypnea. Satting 92% on NRB but tachypneic with RR of 60+. Patient intubated for impending respiratory failure. Writer unable to reach NOK for more collateral information. (2020 18:40)   "    COVID-19 PCR: Detected: This test has been validated by [employer] to be accurate;  though it has not been FDA cleared/approved by the usual pathway.  As with all laboratory tests, results should be correlated with clinical  findings.  https://www.fda.gov/media/868367/download  https://www.fda.gov/media/534961/download (20 @ 19:58)        PAST MEDICAL & SURGICAL HISTORY:  DM (diabetes mellitus)  HLD (hyperlipidemia)  HTN (hypertension)  COPD (chronic obstructive pulmonary disease)  S/P CABG (coronary artery bypass graft)    FAMILY HISTORY:    Allergies    No Known Allergies    Intolerances      Home Medications:  Bumex 1 mg oral tablet: 1 tab(s) orally once a day (29 Dec 2019 18:37)  Coreg 25 mg oral tablet: 1 tab(s) orally 2 times a day (29 Dec 2019 18:37)  hydrALAZINE 25 mg oral tablet: 1 tab(s) orally 2 times a day (29 Dec 2019 18:37)  Lipitor 80 mg oral tablet: 1 tab(s) orally once a day (29 Dec 2019 18:37)  NIFEdipine 60 mg oral tablet, extended release: 1 tab(s) orally once a day (29 Dec 2019 18:37)  ranolazine 1000 mg oral tablet, extended release: 1 tab(s) orally 2 times a day (29 Dec 2019 18:37)  SITagliptin 50 mg oral tablet: 1 tab(s) orally once a day (29 Dec 2019 18:37)  Symbicort 160 mcg-4.5 mcg/inh inhalation aerosol: 2 puff(s) inhaled 2 times a day (29 Dec 2019 18:37)    MEDICATIONS  (STANDING):  anakinra Injectable 100 milliGRAM(s) SubCutaneous every 12 hours  anakinra Injectable   SubCutaneous   chlorhexidine 0.12% Liquid 15 milliLiter(s) Oral Mucosa every 12 hours  chlorhexidine 4% Liquid 1 Application(s) Topical <User Schedule>  dextrose 5%. 1000 milliLiter(s) (50 mL/Hr) IV Continuous <Continuous>  dextrose 5%. 1000 milliLiter(s) (50 mL/Hr) IV Continuous <Continuous>  dextrose 50% Injectable 12.5 Gram(s) IV Push once  dextrose 50% Injectable 25 Gram(s) IV Push once  dextrose 50% Injectable 25 Gram(s) IV Push once  fentaNYL   Infusion. 0.5 MICROgram(s)/kG/Hr (5.67 mL/Hr) IV Continuous <Continuous>  heparin  Infusion.  Unit(s)/Hr (10 mL/Hr) IV Continuous <Continuous>  insulin lispro (HumaLOG) corrective regimen sliding scale   SubCutaneous three times a day before meals  methylPREDNISolone sodium succinate Injectable 40 milliGRAM(s) IV Push three times a day  metolazone 10 milliGRAM(s) Oral <User Schedule>  norepinephrine Infusion 0.05 MICROgram(s)/kG/Min (5.57 mL/Hr) IV Continuous <Continuous>  pantoprazole  Injectable 40 milliGRAM(s) IV Push daily  propofol Infusion 10 MICROgram(s)/kG/Min (6.8 mL/Hr) IV Continuous <Continuous>  sodium zirconium cyclosilicate 10 Gram(s) Oral every 8 hours  vasopressin Infusion 0.04 Unit(s)/Min (2.4 mL/Hr) IV Continuous <Continuous>    MEDICATIONS  (PRN):  dextrose 40% Gel 15 Gram(s) Oral once PRN Blood Glucose LESS THAN 70 milliGRAM(s)/deciliter  glucagon  Injectable 1 milliGRAM(s) IntraMuscular once PRN Glucose LESS THAN 70 milligrams/deciliter  glucagon  Injectable 1 milliGRAM(s) IntraMuscular once PRN Glucose LESS THAN 70 milligrams/deciliter  heparin  Injectable 6000 Unit(s) IV Push every 6 hours PRN For aPTT less than 40    Vital Signs Last 24 Hrs  T(C): 36.1 (2020 15:58), Max: 38.8 (2020 19:30)  T(F): 96.9 (2020 15:58), Max: 101.9 (2020 19:30)  HR: 47 (2020 18:10) (47 - 99)  BP: 148/94 (2020 16:00) (93/40 - 167/89)  BP(mean): 105 (2020 16:00) (77 - 108)  RR: 24 (2020 17:00) (15 - 38)  SpO2: 98% (2020 18:10) (86% - 100%)  Mode: AC/ CMV (Assist Control/ Continuous Mandatory Ventilation)  RR (machine): 24  TV (machine): 450  FiO2: 100  PEEP: 8  ITime: 1  MAP: 13  PIP: 37    Daily     Daily     20 @ 07:01  -  20 @ 07:00  --------------------------------------------------------  IN: 30 mL / OUT: 0 mL / NET: 30 mL    20 @ 07:01  -  20 @ 18:44  --------------------------------------------------------  IN: 1931.8 mL / OUT: 250 mL / NET: 1681.8 mL      CAPILLARY BLOOD GLUCOSE      POCT Blood Glucose.: 264 mg/dL (2020 17:15)  POCT Blood Glucose.: 311 mg/dL (2020 13:34)  POCT Blood Glucose.: 254 mg/dL (2020 10:59)  POCT Blood Glucose.: 234 mg/dL (2020 06:01)  POCT Blood Glucose.: 178 mg/dL (2020 00:07)    PHYSICAL EXAM:      T(C): 36.1 (20 @ 15:58), Max: 38.8 (20 @ 19:30)  HR: 47 (20 @ 18:10) (47 - 99)  BP: 148/94 (20 @ 16:00) (93/40 - 167/89)  RR: 24 (20 @ 17:00) (15 - 38)  SpO2: 98% (20 @ 18:10) (86% - 100%)  Wt(kg): --    Extremities:   edema                  144  |  111<H>  |  57<H>  ----------------------------<  298<H>  5.4<H>   |  23  |  6.07<H>    Ca    7.7<L>      2020 16:26  Phos  4.0       Mg     2.8         TPro  6.9  /  Alb  2.4<L>  /  TBili  0.9  /  DBili  x   /  AST  438<H>  /  ALT  174<H>  /  AlkPhos  68                            13.0   16.01 )-----------( 253      ( 2020 16:26 )             41.0     Creatinine Trend: 6.07<--, 5.79<--, 4.51<--, 3.16<--  Urinalysis Basic - ( 2020 14:14 )    Color: Yellow / Appearance: Slightly Turbid / S.020 / pH: x  Gluc: x / Ketone: Negative  / Bili: Negative / Urobili: Negative mg/dL   Blood: x / Protein: 500 mg/dL / Nitrite: Negative   Leuk Esterase: Small / RBC: 11-25 /HPF / WBC 26-50   Sq Epi: x / Non Sq Epi: Moderate / Bacteria: Many      ABG - ( 2020 17:15 )  pH, Arterial: x     pH, Blood: 7.22  /  pCO2: 49    /  pO2: 102   / HCO3: 20    / Base Excess: -7.9  /  SaO2: 97              Assessment   JOHN septic shock, ischemic ATN, risk for COVID related injury    Plan  Agree with diuretic challenge  Will follow closely for HD indications, likely will benefit in the next 24 hrs;       Gerard Machado MD

## 2020-04-15 NOTE — CHART NOTE - NSCHARTNOTEFT_GEN_A_CORE
HPI:  Patient is a 74 year old male with PMHx HTN HLD DM COPD CABG obese who presents to the ED with shortness of breath. As per ED provider patient presented with marked tachypnea. Satting 92% on NRB but tachypneic with RR of 60+. Patient intubated for impending respiratory failure. Writer unable to reach NOK for more collateral information. (13 Apr 2020 18:40)      24 hr events: minimal urine output/ creatinine 6.11  hypothermic/ bradycardia on monitor     ## Labs:  CBC:                        12.5   17.88 )-----------( 262      ( 15 Apr 2020 03:52 )             39.8     Chem:  04-15    142  |  107  |  61<H>  ----------------------------<  305<H>  5.0   |  23  |  6.11<H>    Ca    7.5<L>      15 Apr 2020 03:52  Phos  7.0     04-15  Mg     3.1     04-15    TPro  7.2  /  Alb  2.4<L>  /  TBili  0.8  /  DBili  x   /  AST  416<H>  /  ALT  219<H>  /  AlkPhos  76  04-15    Coags:  PT/INR - ( 13 Apr 2020 19:22 )   PT: 12.8 sec;   INR: 1.14 ratio         PTT - ( 15 Apr 2020 08:53 )  PTT:60.5 sec  culture blood:  --  04-14 @ 00:51            culture sputum:     No growth to date.           culture urine:  -- 04-14 @ 00:51        ## Imaging:  < from: Xray Chest 1 View- PORTABLE-Urgent (04.13.20 @ 21:28) >    Frontal expiratory view of the right and central chest shows the heart to be similar in size. Endotracheal tube and nasogastric tube are present.  The lungs show similar mid right lung and left perihilar infiltrates. There is no evidence of pneumothorax nor definite pleural effusion.    IMPRESSION:  NG tube to stomach.      ## Medications:    metolazone 10 milliGRAM(s) Oral <User Schedule>  norepinephrine Infusion 0.05 MICROgram(s)/kG/Min IV Continuous <Continuous>  dextrose 40% Gel 15 Gram(s) Oral once PRN  dextrose 50% Injectable 12.5 Gram(s) IV Push once  dextrose 50% Injectable 25 Gram(s) IV Push once  dextrose 50% Injectable 25 Gram(s) IV Push once  glucagon  Injectable 1 milliGRAM(s) IntraMuscular once PRN  glucagon  Injectable 1 milliGRAM(s) IntraMuscular once PRN  glucagon  Injectable 1 milliGRAM(s) IntraMuscular once PRN  glucagon  Injectable 1 milliGRAM(s) IntraMuscular once PRN  insulin glargine Injectable (LANTUS) 5 Unit(s) SubCutaneous at bedtime  insulin lispro (HumaLOG) corrective regimen sliding scale   SubCutaneous every 6 hours  methylPREDNISolone sodium succinate Injectable 40 milliGRAM(s) IV Push three times a day  vasopressin Infusion 0.04 Unit(s)/Min IV Continuous <Continuous>    heparin  Infusion.  Unit(s)/Hr IV Continuous <Continuous>  heparin  Injectable 6000 Unit(s) IV Push every 6 hours PRN    pantoprazole  Injectable 40 milliGRAM(s) IV Push daily    fentaNYL   Infusion. 0.5 MICROgram(s)/kG/Hr IV Continuous <Continuous>  propofol Infusion 10 MICROgram(s)/kG/Min IV Continuous <Continuous>      ## Vitals:  T(C): 35.4 (04-15-20 @ 04:04), Max: 36.1 (04-14-20 @ 12:00)  HR: 51 (04-15-20 @ 10:30) (45 - 61)  BP: 136/83 (04-15-20 @ 10:30) (112/75 - 166/89)  BP(mean): 95 (04-15-20 @ 10:30) (83 - 110)  RR: 24 (04-15-20 @ 10:30) (19 - 28)  SpO2: 97% (04-15-20 @ 10:30) (86% - 98%)  Wt(kg): --  Vent: Mode: AC/ CMV (Assist Control/ Continuous Mandatory Ventilation), RR (machine): 24, RR (patient): 24, TV (machine): 450, FiO2: 100, PEEP: 10, PIP: 29  ABG: ABG - ( 14 Apr 2020 17:15 )  pH, Arterial: x     pH, Blood: 7.22  /  pCO2: 49    /  pO2: 102   / HCO3: 20    / Base Excess: -7.9  /  SaO2: 97            04-14 @ 07:01  -  04-15 @ 07:00  --------------------------------------------------------  IN: 3458.8 mL / OUT: 1150 mL / NET: 2308.8 mL    04-15 @ 07:01  -  04-15 @ 11:30  --------------------------------------------------------  IN: 324 mL / OUT: 155 mL / NET: 169 mL      ## P/E:  Gen: lying comfortably in bed in no apparent distress  Lungs: scattered R[place]   Heart: S1S2 bradycardic   Abd: softly distended   Ext: ++ 2edema   Neuro: sedated     CENTRAL LINE: [ X] YES [ ] NO  LOCATION:   DATE INSERTED: 4/14  REMOVE: [ ] YES [ ] NO      ORTIZ: X[ ] YES [ ] NO    DATE INSERTED: 4/14  REMOVE:  [ ] YES [ ] NO      A-LINE:  [ ] YES [ ] NO  LOCATION:   DATE INSERTED:  REMOVE:  [ ] YES [ ] NO  EXPLAIN:    GLOBAL ISSUE/BEST PRACTICE:  Analgesia: fentanyl   Sedation: propofol   HOB elevation: yes  Stress ulcer prophylaxis:   VTE prophylaxis: heparin ggt   Oral Care:  Glycemic control: yes   Nutrition: yes     CODE STATUS: [ X] full code  [ ] DNR  [ ] DNI  [ ] MOLST  Goals of care discussion: [ ] yes     Assessment/PLAN:  Assessment/  Plan: 74 year old male with PMH HTN/HLD/DM CAD  admitted with respiratory failure requiring intubation 2nd to COVID.     ##Respiratory Failure  - continue ventilator support  - will increase PEEP to 12   - wean as tolerated   - continue sedation     ## Covid   - continue anakinra   - continue solumedrol   -continue heparin ggt  (D.Dimer >6000)    ## shock state   - continue levophed/ vasopressin ( titrate off vaso)   - maintain Map > 65    ## JOHN   - creatine remains elevated   - 6.11<-- 3.16 (on admission)  - continue lokelma for hyperkalemia   -received lasix 120mg X 1 with metolazone - with some respons e      ## Nutrition  - start tube feeds  - continue protonix     ##ID   + BC (gram positive cocci - coag neg )  - most likely containment  - received 1 dose vancomycin overnight   - continue to monitor leukocytosis

## 2020-04-15 NOTE — PROGRESS NOTE ADULT - SUBJECTIVE AND OBJECTIVE BOX
[facility] NEPHROLOGY SERVICES, [facility]  NEPHROLOGY AND HYPERTENSION  [address]  [address]  [place] [address]  [phone]    MD JEET OLIVAREZ MD ANDREY GONCHARUK, MD MADHU KORRAPATI, MD YELENA ROSENBERG, MD BINNY KOSHY, MD CHRISTOPHER CAPUTO, MD EDWARD BOVER, MD          Patient events noted    MEDICATIONS  (STANDING):  anakinra Injectable 100 milliGRAM(s) SubCutaneous every 12 hours  anakinra Injectable   SubCutaneous   chlorhexidine 0.12% Liquid 15 milliLiter(s) Oral Mucosa every 12 hours  chlorhexidine 4% Liquid 1 Application(s) Topical <User Schedule>  dextrose 5%. 1000 milliLiter(s) (50 mL/Hr) IV Continuous <Continuous>  dextrose 5%. 1000 milliLiter(s) (50 mL/Hr) IV Continuous <Continuous>  dextrose 5%. 1000 milliLiter(s) (50 mL/Hr) IV Continuous <Continuous>  dextrose 5%. 1000 milliLiter(s) (50 mL/Hr) IV Continuous <Continuous>  dextrose 50% Injectable 12.5 Gram(s) IV Push once  dextrose 50% Injectable 25 Gram(s) IV Push once  dextrose 50% Injectable 25 Gram(s) IV Push once  fentaNYL   Infusion. 0.5 MICROgram(s)/kG/Hr (5.67 mL/Hr) IV Continuous <Continuous>  heparin  Infusion.  Unit(s)/Hr (10 mL/Hr) IV Continuous <Continuous>  insulin glargine Injectable (LANTUS) 5 Unit(s) SubCutaneous at bedtime  insulin lispro (HumaLOG) corrective regimen sliding scale   SubCutaneous every 6 hours  methylPREDNISolone sodium succinate Injectable 40 milliGRAM(s) IV Push three times a day  metolazone 10 milliGRAM(s) Oral <User Schedule>  norepinephrine Infusion 0.05 MICROgram(s)/kG/Min (5.57 mL/Hr) IV Continuous <Continuous>  pantoprazole  Injectable 40 milliGRAM(s) IV Push daily  propofol Infusion 10 MICROgram(s)/kG/Min (6.8 mL/Hr) IV Continuous <Continuous>  sodium zirconium cyclosilicate 10 Gram(s) Oral every 8 hours  vasopressin Infusion 0.04 Unit(s)/Min (2.4 mL/Hr) IV Continuous <Continuous>    MEDICATIONS  (PRN):  dextrose 40% Gel 15 Gram(s) Oral once PRN Blood Glucose LESS THAN 70 milliGRAM(s)/deciliter  glucagon  Injectable 1 milliGRAM(s) IntraMuscular once PRN Glucose LESS THAN 70 milligrams/deciliter  glucagon  Injectable 1 milliGRAM(s) IntraMuscular once PRN Glucose LESS THAN 70 milligrams/deciliter  glucagon  Injectable 1 milliGRAM(s) IntraMuscular once PRN Glucose LESS THAN 70 milligrams/deciliter  glucagon  Injectable 1 milliGRAM(s) IntraMuscular once PRN Glucose LESS THAN 70 milligrams/deciliter  heparin  Injectable 6000 Unit(s) IV Push every 6 hours PRN For aPTT less than 40      04-14-20 @ 07:01  -  04-15-20 @ 07:00  --------------------------------------------------------  IN: 3458.8 mL / OUT: 1150 mL / NET: 2308.8 mL    04-15-20 @ 07:01  -  04-15-20 @ 20:15  --------------------------------------------------------  IN: 944.5 mL / OUT: 605 mL / NET: 339.5 mL      PHYSICAL EXAM:      T(C): 36.2 (04-15-20 @ 19:33), Max: 36.7 (04-15-20 @ 12:00)  HR: 55 (04-15-20 @ 19:30) (45 - 55)  BP: 123/77 (04-15-20 @ 19:30) (112/75 - 166/89)  RR: 24 (04-15-20 @ 19:30) (24 - 24)  SpO2: 97% (04-15-20 @ 19:30) (96% - 99%)  Wt(kg): --    Extremities:   edema                                    12.5   17.88 )-----------( 262      ( 15 Apr 2020 03:52 )             39.8     04-15    142  |  107  |  61<H>  ----------------------------<  305<H>  5.0   |  23  |  6.11<H>    Ca    7.5<L>      15 Apr 2020 03:52  Phos  7.0     04-15  Mg     3.1     04-15    TPro  7.2  /  Alb  2.4<L>  /  TBili  0.8  /  DBili  x   /  AST  416<H>  /  ALT  219<H>  /  AlkPhos  76  04-15    ABG - ( 14 Apr 2020 17:15 )  pH, Arterial: x     pH, Blood: 7.22  /  pCO2: 49    /  pO2: 102   / HCO3: 20    / Base Excess: -7.9  /  SaO2: 97                LIVER FUNCTIONS - ( 15 Apr 2020 03:52 )  Alb: 2.4 g/dL / Pro: 7.2 gm/dL / ALK PHOS: 76 U/L / ALT: 219 U/L / AST: 416 U/L / GGT: x           Creatinine Trend: 6.11<--, 6.07<--, 5.79<--, 4.51<--, 3.16<--  Mode: AC/ CMV (Assist Control/ Continuous Mandatory Ventilation)  RR (machine): 24  TV (machine): 450  FiO2: 100  PEEP: 12  ITime: 1  MAP: 16  PIP: 29        Assessment   JOHN septic shock, ischemic ATN, risk for COVID related injury  Non oliguric    Plan  Continue diuretic challenge  Will follow closely for HD indications.       Gerard Machado MD [facility] NEPHROLOGY SERVICES, [facility]  NEPHROLOGY AND HYPERTENSION  [address]  [address]  [place] [address]  [phone]    MD JEET OLIVAREZ MD ANDREY GONCHARUK, MD MADHU KORRAPATI, MD YELENA ROSENBERG, MD BINNY KOSHY, MD CHRISTOPHER CAPUTO, MD EDWARD BOVER, MD          Patient events noted    MEDICATIONS  (STANDING):  anakinra Injectable 100 milliGRAM(s) SubCutaneous every 12 hours  anakinra Injectable   SubCutaneous   chlorhexidine 0.12% Liquid 15 milliLiter(s) Oral Mucosa every 12 hours  chlorhexidine 4% Liquid 1 Application(s) Topical <User Schedule>  dextrose 5%. 1000 milliLiter(s) (50 mL/Hr) IV Continuous <Continuous>  dextrose 5%. 1000 milliLiter(s) (50 mL/Hr) IV Continuous <Continuous>  dextrose 5%. 1000 milliLiter(s) (50 mL/Hr) IV Continuous <Continuous>  dextrose 5%. 1000 milliLiter(s) (50 mL/Hr) IV Continuous <Continuous>  dextrose 50% Injectable 12.5 Gram(s) IV Push once  dextrose 50% Injectable 25 Gram(s) IV Push once  dextrose 50% Injectable 25 Gram(s) IV Push once  fentaNYL   Infusion. 0.5 MICROgram(s)/kG/Hr (5.67 mL/Hr) IV Continuous <Continuous>  heparin  Infusion.  Unit(s)/Hr (10 mL/Hr) IV Continuous <Continuous>  insulin glargine Injectable (LANTUS) 5 Unit(s) SubCutaneous at bedtime  insulin lispro (HumaLOG) corrective regimen sliding scale   SubCutaneous every 6 hours  methylPREDNISolone sodium succinate Injectable 40 milliGRAM(s) IV Push three times a day  metolazone 10 milliGRAM(s) Oral <User Schedule>  norepinephrine Infusion 0.05 MICROgram(s)/kG/Min (5.57 mL/Hr) IV Continuous <Continuous>  pantoprazole  Injectable 40 milliGRAM(s) IV Push daily  propofol Infusion 10 MICROgram(s)/kG/Min (6.8 mL/Hr) IV Continuous <Continuous>  sodium zirconium cyclosilicate 10 Gram(s) Oral every 8 hours  vasopressin Infusion 0.04 Unit(s)/Min (2.4 mL/Hr) IV Continuous <Continuous>    MEDICATIONS  (PRN):  dextrose 40% Gel 15 Gram(s) Oral once PRN Blood Glucose LESS THAN 70 milliGRAM(s)/deciliter  glucagon  Injectable 1 milliGRAM(s) IntraMuscular once PRN Glucose LESS THAN 70 milligrams/deciliter  glucagon  Injectable 1 milliGRAM(s) IntraMuscular once PRN Glucose LESS THAN 70 milligrams/deciliter  glucagon  Injectable 1 milliGRAM(s) IntraMuscular once PRN Glucose LESS THAN 70 milligrams/deciliter  glucagon  Injectable 1 milliGRAM(s) IntraMuscular once PRN Glucose LESS THAN 70 milligrams/deciliter  heparin  Injectable 6000 Unit(s) IV Push every 6 hours PRN For aPTT less than 40      04-14-20 @ 07:01  -  04-15-20 @ 07:00  --------------------------------------------------------  IN: 3458.8 mL / OUT: 1150 mL / NET: 2308.8 mL    04-15-20 @ 07:01  -  04-15-20 @ 20:15  --------------------------------------------------------  IN: 944.5 mL / OUT: 605 mL / NET: 339.5 mL      PHYSICAL EXAM:      T(C): 36.2 (04-15-20 @ 19:33), Max: 36.7 (04-15-20 @ 12:00)  HR: 55 (04-15-20 @ 19:30) (45 - 55)  BP: 123/77 (04-15-20 @ 19:30) (112/75 - 166/89)  RR: 24 (04-15-20 @ 19:30) (24 - 24)  SpO2: 97% (04-15-20 @ 19:30) (96% - 99%)  Wt(kg): --    Extremities:   edema                                    12.5   17.88 )-----------( 262      ( 15 Apr 2020 03:52 )             39.8     04-15    142  |  107  |  61<H>  ----------------------------<  305<H>  5.0   |  23  |  6.11<H>    Ca    7.5<L>      15 Apr 2020 03:52  Phos  7.0     04-15  Mg     3.1     04-15    TPro  7.2  /  Alb  2.4<L>  /  TBili  0.8  /  DBili  x   /  AST  416<H>  /  ALT  219<H>  /  AlkPhos  76  04-15    ABG - ( 14 Apr 2020 17:15 )  pH, Arterial: x     pH, Blood: 7.22  /  pCO2: 49    /  pO2: 102   / HCO3: 20    / Base Excess: -7.9  /  SaO2: 97                LIVER FUNCTIONS - ( 15 Apr 2020 03:52 )  Alb: 2.4 g/dL / Pro: 7.2 gm/dL / ALK PHOS: 76 U/L / ALT: 219 U/L / AST: 416 U/L / GGT: x           Creatinine Trend: 6.11<--, 6.07<--, 5.79<--, 4.51<--, 3.16<--  Mode: AC/ CMV (Assist Control/ Continuous Mandatory Ventilation)  RR (machine): 24  TV (machine): 450  FiO2: 100  PEEP: 12  ITime: 1  MAP: 16  PIP: 29        Assessment   JOHN septic shock, ischemic ATN, risk for COVID related injury  Non oliguric    Plan  Continue diuretic challenge with IVP lasix followed by drip 10 mg /hr  Will follow closely for HD indications.       Gerard Machado MD

## 2020-04-16 NOTE — PROGRESS NOTE ADULT - SUBJECTIVE AND OBJECTIVE BOX
[facility] NEPHROLOGY SERVICES, [facility]  NEPHROLOGY AND HYPERTENSION  [address]  [address]  [place] [address]  [phone]    MD JEET OLIVAREZ MD ANDREY GONCHARUK, MD MADHU KORRAPATI, MD YELENA ROSENBERG, MD BINNY KOSHY, MD CHRISTOPHER CAPUTO, MD EDWARD BOVER, MD          Patient events noted    MEDICATIONS  (STANDING):  anakinra Injectable 100 milliGRAM(s) SubCutaneous every 12 hours  anakinra Injectable   SubCutaneous   chlorhexidine 0.12% Liquid 15 milliLiter(s) Oral Mucosa every 12 hours  chlorhexidine 4% Liquid 1 Application(s) Topical <User Schedule>  dextrose 5%. 1000 milliLiter(s) (50 mL/Hr) IV Continuous <Continuous>  dextrose 5%. 1000 milliLiter(s) (50 mL/Hr) IV Continuous <Continuous>  dextrose 5%. 1000 milliLiter(s) (50 mL/Hr) IV Continuous <Continuous>  dextrose 5%. 1000 milliLiter(s) (50 mL/Hr) IV Continuous <Continuous>  dextrose 50% Injectable 12.5 Gram(s) IV Push once  dextrose 50% Injectable 25 Gram(s) IV Push once  dextrose 50% Injectable 25 Gram(s) IV Push once  fentaNYL   Infusion. 0.5 MICROgram(s)/kG/Hr (5.67 mL/Hr) IV Continuous <Continuous>  furosemide Infusion 10 mG/Hr (5 mL/Hr) IV Continuous <Continuous>  heparin  Infusion.  Unit(s)/Hr (10 mL/Hr) IV Continuous <Continuous>  insulin glargine Injectable (LANTUS) 5 Unit(s) SubCutaneous at bedtime  insulin lispro (HumaLOG) corrective regimen sliding scale   SubCutaneous every 6 hours  methylPREDNISolone sodium succinate Injectable 40 milliGRAM(s) IV Push three times a day  metolazone 10 milliGRAM(s) Oral <User Schedule>  norepinephrine Infusion 0.05 MICROgram(s)/kG/Min (5.57 mL/Hr) IV Continuous <Continuous>  pantoprazole  Injectable 40 milliGRAM(s) IV Push daily  propofol Infusion 10 MICROgram(s)/kG/Min (6.8 mL/Hr) IV Continuous <Continuous>  vasopressin Infusion 0.04 Unit(s)/Min (2.4 mL/Hr) IV Continuous <Continuous>    MEDICATIONS  (PRN):  dextrose 40% Gel 15 Gram(s) Oral once PRN Blood Glucose LESS THAN 70 milliGRAM(s)/deciliter  glucagon  Injectable 1 milliGRAM(s) IntraMuscular once PRN Glucose LESS THAN 70 milligrams/deciliter  glucagon  Injectable 1 milliGRAM(s) IntraMuscular once PRN Glucose LESS THAN 70 milligrams/deciliter  glucagon  Injectable 1 milliGRAM(s) IntraMuscular once PRN Glucose LESS THAN 70 milligrams/deciliter  glucagon  Injectable 1 milliGRAM(s) IntraMuscular once PRN Glucose LESS THAN 70 milligrams/deciliter  heparin  Injectable 6000 Unit(s) IV Push every 6 hours PRN For aPTT less than 40      04-15-20 @ 07:01  -  04-16-20 @ 07:00  --------------------------------------------------------  IN: 1829.7 mL / OUT: 1330 mL / NET: 499.7 mL    04-16-20 @ 07:01  -  04-16-20 @ 17:49  --------------------------------------------------------  IN: 560.6 mL / OUT: 500 mL / NET: 60.6 mL      PHYSICAL EXAM:      T(C): 36.9 (04-16-20 @ 15:44), Max: 37.1 (04-16-20 @ 12:25)  HR: 75 (04-16-20 @ 16:00) (52 - 82)  BP: 97/66 (04-16-20 @ 16:00) (87/60 - 137/82)  RR: 24 (04-16-20 @ 16:00) (18 - 24)  SpO2: 89% (04-16-20 @ 16:00) (86% - 100%)  Wt(kg): --    Extremities:   edema                                    12.1   16.15 )-----------( 256      ( 16 Apr 2020 03:21 )             37.9     04-16    142  |  106  |  65<H>  ----------------------------<  213<H>  4.7   |  25  |  6.93<H>    Ca    7.6<L>      16 Apr 2020 03:21  Phos  8.5     04-16  Mg     3.0     04-16    TPro  7.3  /  Alb  2.4<L>  /  TBili  0.5  /  DBili  x   /  AST  349<H>  /  ALT  285<H>  /  AlkPhos  72  04-16      LIVER FUNCTIONS - ( 16 Apr 2020 03:21 )  Alb: 2.4 g/dL / Pro: 7.3 gm/dL / ALK PHOS: 72 U/L / ALT: 285 U/L / AST: 349 U/L / GGT: x           Creatinine Trend: 6.93<--, 6.11<--, 6.07<--, 5.79<--, 4.51<--, 3.16<--  Mode: AC/ CMV (Assist Control/ Continuous Mandatory Ventilation)  RR (machine): 24  TV (machine): 450  FiO2: 80  PEEP: 15  ITime: 1  MAP: 20  PIP: 34    Assessment   JOHN septic shock, ischemic ATN, risk for COVID related injury  Non oliguric    Plan  Continue diuretic challenge with IVP lasix followed by drip 10 mg /hr  Increasing UO' FIO2 decreased 80  Will follow closely for HD indications.       Gerard Machado MD

## 2020-04-16 NOTE — DIETITIAN INITIAL EVALUATION ADULT. - ENERGY NEEDS
Ht (cm):  177.8 (estimated)   Wt (kg): 120.2 kg (04-16)      IBW:  75.2   %IBW:     155%    UBW:  stable  %UBW: stable

## 2020-04-16 NOTE — DIETITIAN INITIAL EVALUATION ADULT. - PERTINENT MEDS FT
MEDICATIONS  (STANDING):  anakinra Injectable 100 milliGRAM(s) SubCutaneous every 12 hours  anakinra Injectable   SubCutaneous   chlorhexidine 0.12% Liquid 15 milliLiter(s) Oral Mucosa every 12 hours  chlorhexidine 4% Liquid 1 Application(s) Topical <User Schedule>  dextrose 5%. 1000 milliLiter(s) (50 mL/Hr) IV Continuous <Continuous>  dextrose 5%. 1000 milliLiter(s) (50 mL/Hr) IV Continuous <Continuous>  dextrose 5%. 1000 milliLiter(s) (50 mL/Hr) IV Continuous <Continuous>  dextrose 5%. 1000 milliLiter(s) (50 mL/Hr) IV Continuous <Continuous>  dextrose 50% Injectable 12.5 Gram(s) IV Push once  dextrose 50% Injectable 25 Gram(s) IV Push once  dextrose 50% Injectable 25 Gram(s) IV Push once  fentaNYL   Infusion. 0.5 MICROgram(s)/kG/Hr (5.67 mL/Hr) IV Continuous <Continuous>  furosemide Infusion 10 mG/Hr (5 mL/Hr) IV Continuous <Continuous>  heparin  Infusion.  Unit(s)/Hr (10 mL/Hr) IV Continuous <Continuous>  insulin glargine Injectable (LANTUS) 5 Unit(s) SubCutaneous at bedtime  insulin lispro (HumaLOG) corrective regimen sliding scale   SubCutaneous every 6 hours  methylPREDNISolone sodium succinate Injectable 40 milliGRAM(s) IV Push three times a day  metolazone 10 milliGRAM(s) Oral <User Schedule>  norepinephrine Infusion 0.05 MICROgram(s)/kG/Min (5.57 mL/Hr) IV Continuous <Continuous>  pantoprazole  Injectable 40 milliGRAM(s) IV Push daily  propofol Infusion 10 MICROgram(s)/kG/Min (6.8 mL/Hr) IV Continuous <Continuous>  vasopressin Infusion 0.04 Unit(s)/Min (2.4 mL/Hr) IV Continuous <Continuous>    MEDICATIONS  (PRN):  dextrose 40% Gel 15 Gram(s) Oral once PRN Blood Glucose LESS THAN 70 milliGRAM(s)/deciliter  glucagon  Injectable 1 milliGRAM(s) IntraMuscular once PRN Glucose LESS THAN 70 milligrams/deciliter  glucagon  Injectable 1 milliGRAM(s) IntraMuscular once PRN Glucose LESS THAN 70 milligrams/deciliter  glucagon  Injectable 1 milliGRAM(s) IntraMuscular once PRN Glucose LESS THAN 70 milligrams/deciliter  glucagon  Injectable 1 milliGRAM(s) IntraMuscular once PRN Glucose LESS THAN 70 milligrams/deciliter  heparin  Injectable 6000 Unit(s) IV Push every 6 hours PRN For aPTT less than 40

## 2020-04-16 NOTE — DIETITIAN INITIAL EVALUATION ADULT. - PERTINENT LABORATORY DATA
04-16 Na142 mmol/L Glu 213 mg/dL<H> K+ 4.7 mmol/L Cr  6.93 mg/dL<H> BUN 65 mg/dL<H> 04-16 Phos 8.5 mg/dL<H> 04-16 Alb 2.4 g/dL<L> 04-16 Chol 149 mg/dL LDL 66 mg/dL HDL 27 mg/dL<L> Trig 283 mg/dL<H>    POCT:  210, 198, 192, 226

## 2020-04-16 NOTE — PROGRESS NOTE ADULT - SUBJECTIVE AND OBJECTIVE BOX
HPI:  Pt is a 75 yo obese M with h/o COPD, CABG, HTN,  HLD and DM admitted to the ICU with 1) Hypoxic resp failure failure requiring intubation 2 to Covid-19 PNA 2) Septic shock 3) JOHN    ## Labs:  CBC:                        12.1   16.15 )-----------( 256      ( 16 Apr 2020 03:21 )             37.9     Chem:  04-16    142  |  106  |  65<H>  ----------------------------<  213<H>  4.7   |  25  |  6.93<H>    Ca    7.6<L>      16 Apr 2020 03:21  Phos  8.5     04-16  Mg     3.0     04-16    TPro  7.3  /  Alb  2.4<L>  /  TBili  0.5  /  DBili  x   /  AST  349<H>  /  ALT  285<H>  /  AlkPhos  72  04-16    Coags:  PTT - ( 16 Apr 2020 12:43 )  PTT:62.1 sec  culture blood:  --  04-14 @ 18:16            culture sputum:     >100,000 CFU/ml Enterococcus faecalis           culture urine:  -- 04-14 @ 18:16        ## Imaging:    ## Medications:    furosemide Infusion 10 mG/Hr IV Continuous <Continuous>  metolazone 10 milliGRAM(s) Oral <User Schedule>  norepinephrine Infusion 0.05 MICROgram(s)/kG/Min IV Continuous <Continuous>      dextrose 40% Gel 15 Gram(s) Oral once PRN  dextrose 50% Injectable 12.5 Gram(s) IV Push once  dextrose 50% Injectable 25 Gram(s) IV Push once  dextrose 50% Injectable 25 Gram(s) IV Push once  glucagon  Injectable 1 milliGRAM(s) IntraMuscular once PRN  glucagon  Injectable 1 milliGRAM(s) IntraMuscular once PRN  glucagon  Injectable 1 milliGRAM(s) IntraMuscular once PRN  glucagon  Injectable 1 milliGRAM(s) IntraMuscular once PRN  insulin glargine Injectable (LANTUS) 5 Unit(s) SubCutaneous at bedtime  insulin lispro (HumaLOG) corrective regimen sliding scale   SubCutaneous every 6 hours  methylPREDNISolone sodium succinate Injectable 40 milliGRAM(s) IV Push three times a day  vasopressin Infusion 0.04 Unit(s)/Min IV Continuous <Continuous>    heparin  Infusion.  Unit(s)/Hr IV Continuous <Continuous>  heparin  Injectable 6000 Unit(s) IV Push every 6 hours PRN    pantoprazole  Injectable 40 milliGRAM(s) IV Push daily    fentaNYL   Infusion. 0.5 MICROgram(s)/kG/Hr IV Continuous <Continuous>  propofol Infusion 10 MICROgram(s)/kG/Min IV Continuous <Continuous>      ## Vitals:  T(C): 37.1 (04-16-20 @ 12:25), Max: 37.1 (04-16-20 @ 12:25)  HR: 65 (04-16-20 @ 15:00) (50 - 82)  BP: 110/75 (04-16-20 @ 15:00) (87/60 - 137/82)  BP(mean): 83 (04-16-20 @ 15:00) (66 - 97)  RR: 24 (04-16-20 @ 15:00) (18 - 24)  SpO2: 94% (04-16-20 @ 15:00) (86% - 100%)  Wt(kg): --  Vent: Mode: AC/ CMV (Assist Control/ Continuous Mandatory Ventilation), RR (machine): 24, RR (patient): 24, TV (machine): 450, FiO2: 80, PEEP: 15, PIP: 34  ABG: ABG - ( 14 Apr 2020 17:15 )  pH, Arterial: x     pH, Blood: 7.22  /  pCO2: 49    /  pO2: 102   / HCO3: 20    / Base Excess: -7.9  /  SaO2: 97                    04-15 @ 07:01  -  04-16 @ 07:00  --------------------------------------------------------  IN: 1829.7 mL / OUT: 1330 mL / NET: 499.7 mL    04-16 @ 07:01  -  04-16 @ 15:18  --------------------------------------------------------  IN: 348.7 mL / OUT: 500 mL / NET: -151.3 mL          ## P/E:  Gen: lying comfortably in bed in no apparent distress  Mouth:   Neck:  Lungs:   Heart:   Abd:  Ext:  Neuro:    CENTRAL LINE: [ ] YES [ ] NO  LOCATION:   DATE INSERTED:  REMOVE: [ ] YES [ ] NO      ORTIZ: [ ] YES [ ] NO    DATE INSERTED:  REMOVE:  [ ] YES [ ] NO      A-LINE:  [ ] YES [ ] NO  LOCATION:   DATE INSERTED:  REMOVE:  [ ] YES [ ] NO  EXPLAIN:    GLOBAL ISSUE/BEST PRACTICE:  Analgesia:  Sedation:  HOB elevation: yes  Stress ulcer prophylaxis:  VTE prophylaxis:  Oral Care:  Glycemic control:  Nutrition:    CODE STATUS: [ ] full code  [ ] DNR  [ ] DNI  [ ] MOLST  Goals of care discussion: [ ] yes

## 2020-04-16 NOTE — DIETITIAN INITIAL EVALUATION ADULT. - ETIOLOGY
decreased ability to consume protein-energy in the presences of respiratory failure s/p intubated/sedated

## 2020-04-16 NOTE — DIETITIAN INITIAL EVALUATION ADULT. - OTHER INFO
Pt on isolation: airborne/contact for COVID 19+ maintained. Pt sedated/intubated, non verbal; unable to obtain diet hx from pt at this time. Spoke with daughter Trudy via phone & reviewed EMR. Pt lives with daughter. Daughter does cooking and shopping. PTA pt with very poor PO intake x 3 days PTA. Last BM 04/13 x 1 at home. Per daughter pt would consume ~ 1 ensure throughout the day x 3 days. Actual UBW unknown ~>250#. Daughter reported blood glucose controlled with medications, pt did not follow DM diet at home. Nephrology monitoring closely for possible HD, will make TF rec's for non-HD JOHN.  Pt height estimated at 5'10''.

## 2020-04-16 NOTE — DIETITIAN INITIAL EVALUATION ADULT. - ENTERAL
Based on TF pump availability. CONTINUOUS: Suplena, Total Volume 1080 mL, Goal Rate 45 mL/hr.    BOLUS: Suplena, Total Volume 1080mL, Bolus 270 mL q6hr (provides:  1944, 45 grams of protein)

## 2020-04-17 NOTE — PROGRESS NOTE ADULT - SUBJECTIVE AND OBJECTIVE BOX
CC: Patient is a 74y old  Male who presents with a chief complaint of Shortness of Breath (16 Apr 2020 17:49)      ## HPI:HPI:  Patient is a 74 year old male with PMHx HTN HLD DM COPD CABG obese who presents to the ED with shortness of breath. As per ED provider patient presented with marked tachypnea. Satting 92% on NRB but tachypneic with RR of 60+. Patient intubated for impending respiratory failure. Writer unable to reach NOK for more collateral information. (13 Apr 2020 18:40)      O/N: patient remains sedated on pressors with high vent requirements    ## ROS:  unable to assess ros due to mental status   ## EXAM  ICU Vital Signs Last 24 Hrs  T(C): 36.6 (17 Apr 2020 08:00), Max: 37.6 (17 Apr 2020 04:00)  T(F): 97.8 (17 Apr 2020 08:00), Max: 99.7 (17 Apr 2020 04:00)  HR: 65 (17 Apr 2020 09:30) (50 - 82)  BP: 116/64 (17 Apr 2020 09:30) (87/60 - 134/64)  BP(mean): 76 (17 Apr 2020 09:30) (66 - 91)  ABP: --  ABP(mean): --  RR: 24 (17 Apr 2020 09:30) (24 - 24)  SpO2: 88% (17 Apr 2020 09:30) (86% - 98%)    CON : NAD  EENT : EOMI, MMM  NECK : Full ROM  RESP : CTAB no increased WOB  CARD : rrr no m/r/g  ABD : S NT ND NABS no rebound  EXT : No edema  NEURO : sedated  ## Labs:  Lab Results:  CBC  CBC Full  -  ( 17 Apr 2020 03:02 )  WBC Count : 14.76 K/uL  RBC Count : 3.78 M/uL  Hemoglobin : 11.5 g/dL  Hematocrit : 37.0 %  Platelet Count - Automated : 278 K/uL  Mean Cell Volume : 97.9 fl  Mean Cell Hemoglobin : 30.4 pg  Mean Cell Hemoglobin Concentration : 31.1 gm/dL  Auto Neutrophil # : x  Auto Lymphocyte # : x  Auto Monocyte # : x  Auto Eosinophil # : x  Auto Basophil # : x  Auto Neutrophil % : x  Auto Lymphocyte % : x  Auto Monocyte % : x  Auto Eosinophil % : x  Auto Basophil % : x    .		Differential:	[] Automated		[] Manual  Chemistry                        11.5   14.76 )-----------( 278      ( 17 Apr 2020 03:02 )             37.0     04-17    142  |  107  |  85<H>  ----------------------------<  203<H>  4.7   |  22  |  8.22<H>    Ca    7.6<L>      17 Apr 2020 03:02  Phos  8.7     04-17  Mg     3.1     04-17    TPro  7.5  /  Alb  2.4<L>  /  TBili  0.6  /  DBili  x   /  AST  250<H>  /  ALT  303<H>  /  AlkPhos  74  04-17    LIVER FUNCTIONS - ( 17 Apr 2020 03:02 )  Alb: 2.4 g/dL / Pro: 7.5 gm/dL / ALK PHOS: 74 U/L / ALT: 303 U/L / AST: 250 U/L / GGT: x           PTT - ( 17 Apr 2020 03:02 )  PTT:57.4 sec          MICROBIOLOGY/CULTURES:  Culture Results:   >100,000 CFU/ml Enterococcus faecalis (04-14 @ 18:16)  Culture Results:   No growth to date. (04-14 @ 00:51)  Culture Results:   Growth in anaerobic bottle: Staphylococcus epidermidis  Coag Negative Staphylococcus  Single set isolate, possible contaminant. Contact  Microbiology if susceptibility testing clinically  indicated.  "Due to technical problems, Proteus sp. will Notbe reported as part of  the BCID panel until further notice"  ***Blood Panel PCR results on this specimen are available  approximately 3 hours after the Gram stain result.***  Gram stain, PCR, and/or culture results may not always  correspond due to difference in methodologies.  ************************************************************  This PCR assay was performed using [email or web address].  The following targets are tested for: Enterococcus,  vancomycin resistant enterococci, Listeria monocytogenes,  coagulase negative staphylococci, S. aureus,  methicillin resistant S. aureus, Streptococcus agalactiae  (Group B), S. pneumoniae, S. pyogenes (Group A),  Acinetobacter baumannii, Enterobacter cloacae, E. coli,  Klebsiella oxytoca, K. pneumoniae, Proteus sp.,  Serratia marcescens, Haemophilus influenzae,  Neisseria meningitidis, Pseudomonas aeruginosa, Candida  albicans, C. glabrata, C krusei, C parapsilosis,  C. tropicalis and the KPC resistance gene. (04-14 @ 00:51)      RADIOLOGY RESULTS:  < from: Xray Chest 1 View- PORTABLE-Urgent (04.13.20 @ 21:28) >    INTERPRETATION:  Chest one view    HISTORY: NG tube placement    COMPARISON STUDY: Earlier the same day    Frontal expiratory view of the right and central chest shows the heart to be similar in size. Endotracheal tube and nasogastric tube are present.    The lungs show similar mid right lung and left perihilar infiltrates. There is no evidence of pneumothorax nor definite pleural effusion.    IMPRESSION:  NG tube to stomach.    Thank you for the courtesy of this referral.    < end of copied text >        ## Medications:  MEDICATIONS  (STANDING):  anakinra Injectable   SubCutaneous   aspirin 325 milliGRAM(s) Oral daily  chlorhexidine 0.12% Liquid 15 milliLiter(s) Oral Mucosa every 12 hours  chlorhexidine 4% Liquid 1 Application(s) Topical <User Schedule>  dextrose 5%. 1000 milliLiter(s) (50 mL/Hr) IV Continuous <Continuous>  dextrose 5%. 1000 milliLiter(s) (50 mL/Hr) IV Continuous <Continuous>  dextrose 5%. 1000 milliLiter(s) (50 mL/Hr) IV Continuous <Continuous>  dextrose 5%. 1000 milliLiter(s) (50 mL/Hr) IV Continuous <Continuous>  dextrose 50% Injectable 12.5 Gram(s) IV Push once  dextrose 50% Injectable 25 Gram(s) IV Push once  dextrose 50% Injectable 25 Gram(s) IV Push once  fentaNYL   Infusion. 0.5 MICROgram(s)/kG/Hr (5.67 mL/Hr) IV Continuous <Continuous>  furosemide   Injectable 80 milliGRAM(s) IV Push once  furosemide Infusion 20 mG/Hr (10 mL/Hr) IV Continuous <Continuous>  heparin  Infusion.  Unit(s)/Hr (10 mL/Hr) IV Continuous <Continuous>  insulin glargine Injectable (LANTUS) 5 Unit(s) SubCutaneous at bedtime  insulin lispro (HumaLOG) corrective regimen sliding scale   SubCutaneous every 6 hours  methylPREDNISolone sodium succinate Injectable 80 milliGRAM(s) IV Push every 12 hours  metolazone 10 milliGRAM(s) Oral <User Schedule>  midodrine 20 milliGRAM(s) Oral every 8 hours  norepinephrine Infusion 0.05 MICROgram(s)/kG/Min (5.57 mL/Hr) IV Continuous <Continuous>  pantoprazole  Injectable 40 milliGRAM(s) IV Push daily  propofol Infusion 10 MICROgram(s)/kG/Min (6.8 mL/Hr) IV Continuous <Continuous>  vasopressin Infusion 0.04 Unit(s)/Min (2.4 mL/Hr) IV Continuous <Continuous>    ## O/E:I&O's Summary    16 Apr 2020 07:01  -  17 Apr 2020 07:00  --------------------------------------------------------  IN: 1562.1 mL / OUT: 710 mL / NET: 852.1 mL    17 Apr 2020 07:01  -  17 Apr 2020 11:24  --------------------------------------------------------  IN: 336.3 mL / OUT: 100 mL / NET: 236.3 mL        ## Daily Issues  - Analgesia: fent  - Sedation: prop  - HOB elevation: Y  - GI ppx (ulcers): ppi  - DVT ppx: Hep gtt  - Nutrition: PO diet  - Central line: Y  - Hale: Y    ## Code status:  Goals of care discussion: [x] yes [ ] no  [x] full code  [ ] DNR  [ ] DNI  [ ] JANET

## 2020-04-17 NOTE — PROGRESS NOTE ADULT - SUBJECTIVE AND OBJECTIVE BOX
[facility] NEPHROLOGY SERVICES, [facility]  NEPHROLOGY AND HYPERTENSION  [address]  [address]  [place] [address]  [phone]    MD JEET OLIVAREZ MD ANDREY GONCHARUK, MD MADHU KORRAPATI, MD YELENA ROSENBERG, MD BINNY KOSHY, MD CHRISTOPHER CAPUTO, MD EDWARD BOVER, MD          Patient events noted    MEDICATIONS  (STANDING):  anakinra Injectable   SubCutaneous   aspirin 325 milliGRAM(s) Oral daily  chlorhexidine 0.12% Liquid 15 milliLiter(s) Oral Mucosa every 12 hours  chlorhexidine 4% Liquid 1 Application(s) Topical <User Schedule>  dextrose 5%. 1000 milliLiter(s) (50 mL/Hr) IV Continuous <Continuous>  dextrose 5%. 1000 milliLiter(s) (50 mL/Hr) IV Continuous <Continuous>  dextrose 5%. 1000 milliLiter(s) (50 mL/Hr) IV Continuous <Continuous>  dextrose 5%. 1000 milliLiter(s) (50 mL/Hr) IV Continuous <Continuous>  dextrose 50% Injectable 12.5 Gram(s) IV Push once  dextrose 50% Injectable 25 Gram(s) IV Push once  dextrose 50% Injectable 25 Gram(s) IV Push once  fentaNYL   Infusion. 0.5 MICROgram(s)/kG/Hr (5.67 mL/Hr) IV Continuous <Continuous>  furosemide Infusion 20 mG/Hr (10 mL/Hr) IV Continuous <Continuous>  heparin  Infusion.  Unit(s)/Hr (10 mL/Hr) IV Continuous <Continuous>  insulin glargine Injectable (LANTUS) 5 Unit(s) SubCutaneous at bedtime  insulin lispro (HumaLOG) corrective regimen sliding scale   SubCutaneous every 6 hours  levoFLOXacin IVPB      methylPREDNISolone sodium succinate Injectable 80 milliGRAM(s) IV Push every 12 hours  metolazone 10 milliGRAM(s) Oral <User Schedule>  midodrine 20 milliGRAM(s) Oral every 8 hours  norepinephrine Infusion 0.05 MICROgram(s)/kG/Min (5.57 mL/Hr) IV Continuous <Continuous>  pantoprazole  Injectable 40 milliGRAM(s) IV Push daily  propofol Infusion 10 MICROgram(s)/kG/Min (6.8 mL/Hr) IV Continuous <Continuous>  vasopressin Infusion 0.04 Unit(s)/Min (2.4 mL/Hr) IV Continuous <Continuous>    MEDICATIONS  (PRN):  dextrose 40% Gel 15 Gram(s) Oral once PRN Blood Glucose LESS THAN 70 milliGRAM(s)/deciliter  glucagon  Injectable 1 milliGRAM(s) IntraMuscular once PRN Glucose LESS THAN 70 milligrams/deciliter  glucagon  Injectable 1 milliGRAM(s) IntraMuscular once PRN Glucose LESS THAN 70 milligrams/deciliter  glucagon  Injectable 1 milliGRAM(s) IntraMuscular once PRN Glucose LESS THAN 70 milligrams/deciliter  glucagon  Injectable 1 milliGRAM(s) IntraMuscular once PRN Glucose LESS THAN 70 milligrams/deciliter  heparin  Injectable 6000 Unit(s) IV Push every 6 hours PRN For aPTT less than 40      04-16-20 @ 07:01  -  04-17-20 @ 07:00  --------------------------------------------------------  IN: 1562.1 mL / OUT: 710 mL / NET: 852.1 mL    04-17-20 @ 07:01  -  04-17-20 @ 20:34  --------------------------------------------------------  IN: 1590.4 mL / OUT: 500 mL / NET: 1090.4 mL      PHYSICAL EXAM:      T(C): 37.1 (04-17-20 @ 19:00), Max: 37.6 (04-17-20 @ 04:00)  HR: 55 (04-17-20 @ 20:00) (50 - 68)  BP: 136/68 (04-17-20 @ 20:00) (103/56 - 146/70)  RR: 30 (04-17-20 @ 20:00) (24 - 30)  SpO2: 98% (04-17-20 @ 20:00) (82% - 100%)  Wt(kg): --    Extremities:   edema                                    11.5   14.76 )-----------( 278      ( 17 Apr 2020 03:02 )             37.0     04-17    142  |  107  |  85<H>  ----------------------------<  203<H>  4.7   |  22  |  8.22<H>    Ca    7.6<L>      17 Apr 2020 03:02  Phos  8.7     04-17  Mg     3.1     04-17    TPro  7.5  /  Alb  2.4<L>  /  TBili  0.6  /  DBili  x   /  AST  250<H>  /  ALT  303<H>  /  AlkPhos  74  04-17    ABG - ( 17 Apr 2020 16:19 )  pH, Arterial: x     pH, Blood: 7.17  /  pCO2: 69    /  pO2: 61    / HCO3: 24    / Base Excess: -5.0  /  SaO2: 86                LIVER FUNCTIONS - ( 17 Apr 2020 03:02 )  Alb: 2.4 g/dL / Pro: 7.5 gm/dL / ALK PHOS: 74 U/L / ALT: 303 U/L / AST: 250 U/L / GGT: x           Creatinine Trend: 8.22<--, 6.93<--, 6.11<--, 6.07<--, 5.79<--, 4.51<--  Mode: AC/ CMV (Assist Control/ Continuous Mandatory Ventilation)  RR (machine): 30  TV (machine): 450  FiO2: 100  PEEP: 15  ITime: 1  MAP: 21  PIP: 41        Assessment   JOHN septic shock, ischemic ATN, risk for COVID related injury  Non oliguric    Plan  Continue diuretic challenge with IVP lasix followed by drip 20 mg /hr  Increasing UO' FIO2   Will follow closely for HD indications in the next 24 hrs    Gerard Machado MD

## 2020-04-18 NOTE — PROGRESS NOTE ADULT - SUBJECTIVE AND OBJECTIVE BOX
CC: Patient is a 74y old  Male who presents with a chief complaint of Shortness of Breath (17 Apr 2020 20:34)      ## HPI:HPI:  Patient is a 74 year old male with PMHx HTN HLD DM COPD CABG obese who presents to the ED with shortness of breath. As per ED provider patient presented with marked tachypnea. Satting 92% on NRB but tachypneic with RR of 60+. Patient intubated for impending respiratory failure. Writer unable to reach NOK for more collateral information. (13 Apr 2020 18:40)      O/N: no acute events    ## ROS:  unable to assess ros due to mental status   ## EXAM  ICU Vital Signs Last 24 Hrs  T(C): 37 (18 Apr 2020 07:42), Max: 37.2 (17 Apr 2020 23:04)  T(F): 98.6 (18 Apr 2020 07:42), Max: 99 (17 Apr 2020 23:04)  HR: 48 (18 Apr 2020 09:33) (45 - 67)  BP: 125/66 (18 Apr 2020 09:00) (103/56 - 146/70)  BP(mean): 80 (18 Apr 2020 09:00) (66 - 90)  ABP: --  ABP(mean): --  RR: 32 (18 Apr 2020 09:33) (24 - 32)  SpO2: 93% (18 Apr 2020 09:33) (82% - 100%)    CON : NAD  EENT : EOMI, MMM  NECK : Full ROM  RESP : CTAB no increased WOB  CARD : rrr no m/r/g  ABD : S NT ND NABS no rebound  EXT : No edema  NEURO : intubated and sedated  ## Labs:  Lab Results:  CBC  CBC Full  -  ( 18 Apr 2020 03:33 )  WBC Count : 12.93 K/uL  RBC Count : 3.32 M/uL  Hemoglobin : 10.1 g/dL  Hematocrit : 31.9 %  Platelet Count - Automated : 235 K/uL  Mean Cell Volume : 96.1 fl  Mean Cell Hemoglobin : 30.4 pg  Mean Cell Hemoglobin Concentration : 31.7 gm/dL  Auto Neutrophil # : 10.96 K/uL  Auto Lymphocyte # : 0.44 K/uL  Auto Monocyte # : 0.88 K/uL  Auto Eosinophil # : 0.00 K/uL  Auto Basophil # : 0.03 K/uL  Auto Neutrophil % : 84.8 %  Auto Lymphocyte % : 3.4 %  Auto Monocyte % : 6.8 %  Auto Eosinophil % : 0.0 %  Auto Basophil % : 0.2 %    .		Differential:	[] Automated		[] Manual  Chemistry                        10.1   12.93 )-----------( 235      ( 18 Apr 2020 03:33 )             31.9     04-18    141  |  102  |  103<H>  ----------------------------<  267<H>  4.1   |  23  |  8.33<H>    Ca    7.6<L>      18 Apr 2020 03:33  Phos  9.3     04-18  Mg     3.2     04-18    TPro  6.6  /  Alb  2.2<L>  /  TBili  0.6  /  DBili  x   /  AST  101<H>  /  ALT  219<H>  /  AlkPhos  68  04-18    LIVER FUNCTIONS - ( 18 Apr 2020 03:33 )  Alb: 2.2 g/dL / Pro: 6.6 gm/dL / ALK PHOS: 68 U/L / ALT: 219 U/L / AST: 101 U/L / GGT: x           PTT - ( 18 Apr 2020 03:33 )  PTT:43.1 sec      ABG - ( 17 Apr 2020 22:31 )  pH, Arterial: x     pH, Blood: 7.18  /  pCO2: 63    /  pO2: 133   / HCO3: 22    / Base Excess: -6.3  /  SaO2: 98            MICROBIOLOGY/CULTURES:  Culture Results:   >100,000 CFU/ml Enterococcus faecalis (04-14 @ 18:16)  Culture Results:   No growth to date. (04-14 @ 00:51)  Culture Results:   Growth in anaerobic bottle: Staphylococcus epidermidis  Coag Negative Staphylococcus  Single set isolate, possible contaminant. Contact  Microbiology if susceptibility testing clinically  indicated.  "Due to technical problems, Proteus sp. will Notbe reported as part of  the BCID panel until further notice"  ***Blood Panel PCR results on this specimen are available  approximately 3 hours after the Gram stain result.***  Gram stain, PCR, and/or culture results may not always  correspond due to difference in methodologies.  ************************************************************  This PCR assay was performed using [employer].  The following targets are tested for: Enterococcus,  vancomycin resistant enterococci, Listeria monocytogenes,  coagulase negative staphylococci, S. aureus,  methicillin resistant S. aureus, Streptococcus agalactiae  (Group B), S. pneumoniae, S. pyogenes (Group A),  Acinetobacter baumannii, Enterobacter cloacae, E. coli,  Klebsiella oxytoca, K. pneumoniae, Proteus sp.,  Serratia marcescens, Haemophilus influenzae,  Neisseria meningitidis, Pseudomonas aeruginosa, Candida  albicans, C. glabrata, C krusei, C parapsilosis,  C. tropicalis and the KPC resistance gene. (04-14 @ 00:51)      RADIOLOGY RESULTS:        ## Medications:  MEDICATIONS  (STANDING):  anakinra Injectable 100 milliGRAM(s) SubCutaneous every 24 hours  anakinra Injectable   SubCutaneous   aspirin 325 milliGRAM(s) Oral daily  chlorhexidine 0.12% Liquid 15 milliLiter(s) Oral Mucosa every 12 hours  chlorhexidine 4% Liquid 1 Application(s) Topical <User Schedule>  dextrose 5%. 1000 milliLiter(s) (50 mL/Hr) IV Continuous <Continuous>  dextrose 5%. 1000 milliLiter(s) (50 mL/Hr) IV Continuous <Continuous>  dextrose 5%. 1000 milliLiter(s) (50 mL/Hr) IV Continuous <Continuous>  dextrose 5%. 1000 milliLiter(s) (50 mL/Hr) IV Continuous <Continuous>  dextrose 50% Injectable 12.5 Gram(s) IV Push once  dextrose 50% Injectable 25 Gram(s) IV Push once  dextrose 50% Injectable 25 Gram(s) IV Push once  fentaNYL   Infusion. 0.5 MICROgram(s)/kG/Hr (5.67 mL/Hr) IV Continuous <Continuous>  furosemide Infusion 20 mG/Hr (10 mL/Hr) IV Continuous <Continuous>  heparin  Infusion.  Unit(s)/Hr (10 mL/Hr) IV Continuous <Continuous>  insulin glargine Injectable (LANTUS) 5 Unit(s) SubCutaneous at bedtime  insulin lispro (HumaLOG) corrective regimen sliding scale   SubCutaneous every 6 hours  levoFLOXacin IVPB      methylPREDNISolone sodium succinate Injectable 80 milliGRAM(s) IV Push every 12 hours  metolazone 10 milliGRAM(s) Oral <User Schedule>  midodrine 20 milliGRAM(s) Oral every 8 hours  pantoprazole  Injectable 40 milliGRAM(s) IV Push daily  propofol Infusion 10 MICROgram(s)/kG/Min (6.8 mL/Hr) IV Continuous <Continuous>  vasopressin Infusion 0.04 Unit(s)/Min (2.4 mL/Hr) IV Continuous <Continuous>    ## O/E:I&O's Summary    17 Apr 2020 07:01  -  18 Apr 2020 07:00  --------------------------------------------------------  IN: 2656.6 mL / OUT: 1200 mL / NET: 1456.6 mL        ## Daily Issues  - Analgesia: N/A  - Sedation: prop  - HOB elevation: Y  - GI ppx (ulcers): ppi  - DVT ppx: Hep SC  - Nutrition: PO diet  - Central line: Y  - Hale: Y      ## Code status:  Goals of care discussion: [x] yes [ ] no  [x] full code  [ ] DNR  [ ] DNI  [ ] JANET

## 2020-04-18 NOTE — PROGRESS NOTE ADULT - SUBJECTIVE AND OBJECTIVE BOX
[facility] NEPHROLOGY SERVICES, [facility]  NEPHROLOGY AND HYPERTENSION  [address]  [address]  [place] [address]  [phone]    MD JEET OLIVAREZ MD ANDREY GONCHARUK, MD MADHU KORRAPATI, MD YELENA ROSENBERG, MD BINNY KOSHY, MD CHRISTOPHER CAPUTO, MD EDWARD BOVER, MD          Patient events noted    MEDICATIONS  (STANDING):  anakinra Injectable 100 milliGRAM(s) SubCutaneous every 24 hours  anakinra Injectable   SubCutaneous   aspirin 325 milliGRAM(s) Oral daily  chlorhexidine 0.12% Liquid 15 milliLiter(s) Oral Mucosa every 12 hours  chlorhexidine 4% Liquid 1 Application(s) Topical <User Schedule>  dextrose 5%. 1000 milliLiter(s) (50 mL/Hr) IV Continuous <Continuous>  dextrose 5%. 1000 milliLiter(s) (50 mL/Hr) IV Continuous <Continuous>  dextrose 5%. 1000 milliLiter(s) (50 mL/Hr) IV Continuous <Continuous>  dextrose 5%. 1000 milliLiter(s) (50 mL/Hr) IV Continuous <Continuous>  dextrose 50% Injectable 12.5 Gram(s) IV Push once  dextrose 50% Injectable 25 Gram(s) IV Push once  dextrose 50% Injectable 25 Gram(s) IV Push once  fentaNYL   Infusion. 0.5 MICROgram(s)/kG/Hr (5.67 mL/Hr) IV Continuous <Continuous>  furosemide Infusion 20 mG/Hr (10 mL/Hr) IV Continuous <Continuous>  heparin  Infusion 1100 Unit(s)/Hr (12 mL/Hr) IV Continuous <Continuous>  insulin glargine Injectable (LANTUS) 5 Unit(s) SubCutaneous at bedtime  insulin lispro (HumaLOG) corrective regimen sliding scale   SubCutaneous every 6 hours  levoFLOXacin IVPB      methylPREDNISolone sodium succinate Injectable 80 milliGRAM(s) IV Push every 12 hours  metolazone 10 milliGRAM(s) Oral <User Schedule>  midodrine 20 milliGRAM(s) Oral every 8 hours  pantoprazole  Injectable 40 milliGRAM(s) IV Push daily  propofol Infusion 10 MICROgram(s)/kG/Min (6.8 mL/Hr) IV Continuous <Continuous>  vasopressin Infusion 0.04 Unit(s)/Min (2.4 mL/Hr) IV Continuous <Continuous>    MEDICATIONS  (PRN):  dextrose 40% Gel 15 Gram(s) Oral once PRN Blood Glucose LESS THAN 70 milliGRAM(s)/deciliter  glucagon  Injectable 1 milliGRAM(s) IntraMuscular once PRN Glucose LESS THAN 70 milligrams/deciliter  glucagon  Injectable 1 milliGRAM(s) IntraMuscular once PRN Glucose LESS THAN 70 milligrams/deciliter  glucagon  Injectable 1 milliGRAM(s) IntraMuscular once PRN Glucose LESS THAN 70 milligrams/deciliter  glucagon  Injectable 1 milliGRAM(s) IntraMuscular once PRN Glucose LESS THAN 70 milligrams/deciliter      04-17-20 @ 07:01  -  04-18-20 @ 07:00  --------------------------------------------------------  IN: 2656.6 mL / OUT: 1200 mL / NET: 1456.6 mL    04-18-20 @ 07:01  -  04-18-20 @ 19:43  --------------------------------------------------------  IN: 1149 mL / OUT: 1800 mL / NET: -651 mL      PHYSICAL EXAM:      T(C): 36.9 (04-18-20 @ 19:18), Max: 37.2 (04-17-20 @ 23:04)  HR: 45 (04-18-20 @ 19:00) (44 - 58)  BP: 140/66 (04-18-20 @ 19:00) (109/55 - 149/71)  RR: 32 (04-18-20 @ 19:00) (23 - 32)  SpO2: 96% (04-18-20 @ 19:00) (93% - 100%)  Wt(kg): --    Extremities:   edema                                    10.1   12.93 )-----------( 235      ( 18 Apr 2020 03:33 )             31.9     04-18    141  |  102  |  103<H>  ----------------------------<  267<H>  4.1   |  23  |  8.33<H>    Ca    7.6<L>      18 Apr 2020 03:33  Phos  9.3     04-18  Mg     3.2     04-18    TPro  6.6  /  Alb  2.2<L>  /  TBili  0.6  /  DBili  x   /  AST  101<H>  /  ALT  219<H>  /  AlkPhos  68  04-18    ABG - ( 18 Apr 2020 12:22 )  pH, Arterial: x     pH, Blood: 7.22  /  pCO2: 54    /  pO2: 84    / HCO3: 22    / Base Excess: -5.8  /  SaO2: 95                LIVER FUNCTIONS - ( 18 Apr 2020 03:33 )  Alb: 2.2 g/dL / Pro: 6.6 gm/dL / ALK PHOS: 68 U/L / ALT: 219 U/L / AST: 101 U/L / GGT: x           Creatinine Trend: 8.33<--, 8.22<--, 6.93<--, 6.11<--, 6.07<--, 5.79<--  Mode: AC/ CMV (Assist Control/ Continuous Mandatory Ventilation)  RR (machine): 32  TV (machine): 470  FiO2: 80  PEEP: 15  ITime: 1  MAP: 24  PIP: 38          Assessment   JOHN septic shock, ischemic ATN, risk for COVID related injury  Non oliguric    Plan  Continue diuretic challenge with IVP lasix followed by drip 20 mg /hr  Will follow closely for HD indications     Gerard Machado MD

## 2020-04-19 NOTE — PROGRESS NOTE ADULT - SUBJECTIVE AND OBJECTIVE BOX
[facility] NEPHROLOGY SERVICES, [facility]  NEPHROLOGY AND HYPERTENSION  [address]  [address]  [place] [address]  [phone]    MD JEET OLIVAREZ MD ANDREY GONCHARUK, MD MADHU KORRAPATI, MD YELENA ROSENBERG, MD BINNY KOSHY, MD CHRISTOPHER CAPUTO, MD EDWARD BOVER, MD          Patient events noted    MEDICATIONS  (STANDING):  anakinra Injectable 100 milliGRAM(s) SubCutaneous every 24 hours  anakinra Injectable   SubCutaneous   aspirin 325 milliGRAM(s) Oral daily  chlorhexidine 0.12% Liquid 15 milliLiter(s) Oral Mucosa every 12 hours  chlorhexidine 4% Liquid 1 Application(s) Topical <User Schedule>  cisatracurium Infusion 3 MICROgram(s)/kG/Min (21.4 mL/Hr) IV Continuous <Continuous>  dextrose 5%. 1000 milliLiter(s) (50 mL/Hr) IV Continuous <Continuous>  dextrose 5%. 1000 milliLiter(s) (50 mL/Hr) IV Continuous <Continuous>  dextrose 5%. 1000 milliLiter(s) (50 mL/Hr) IV Continuous <Continuous>  dextrose 5%. 1000 milliLiter(s) (50 mL/Hr) IV Continuous <Continuous>  dextrose 50% Injectable 12.5 Gram(s) IV Push once  dextrose 50% Injectable 25 Gram(s) IV Push once  dextrose 50% Injectable 25 Gram(s) IV Push once  fentaNYL   Infusion. 0.5 MICROgram(s)/kG/Hr (5.67 mL/Hr) IV Continuous <Continuous>  furosemide Infusion 20 mG/Hr (10 mL/Hr) IV Continuous <Continuous>  heparin  Infusion 1100 Unit(s)/Hr (12 mL/Hr) IV Continuous <Continuous>  insulin glargine Injectable (LANTUS) 5 Unit(s) SubCutaneous at bedtime  insulin lispro (HumaLOG) corrective regimen sliding scale   SubCutaneous every 6 hours  levoFLOXacin IVPB      levoFLOXacin IVPB 250 milliGRAM(s) IV Intermittent every 48 hours  methylPREDNISolone sodium succinate Injectable 80 milliGRAM(s) IV Push every 12 hours  metolazone 10 milliGRAM(s) Oral <User Schedule>  midodrine 20 milliGRAM(s) Oral every 8 hours  pantoprazole  Injectable 40 milliGRAM(s) IV Push daily  propofol Infusion 10 MICROgram(s)/kG/Min (6.8 mL/Hr) IV Continuous <Continuous>  vasopressin Infusion 0.04 Unit(s)/Min (2.4 mL/Hr) IV Continuous <Continuous>    MEDICATIONS  (PRN):  dextrose 40% Gel 15 Gram(s) Oral once PRN Blood Glucose LESS THAN 70 milliGRAM(s)/deciliter  glucagon  Injectable 1 milliGRAM(s) IntraMuscular once PRN Glucose LESS THAN 70 milligrams/deciliter  glucagon  Injectable 1 milliGRAM(s) IntraMuscular once PRN Glucose LESS THAN 70 milligrams/deciliter  glucagon  Injectable 1 milliGRAM(s) IntraMuscular once PRN Glucose LESS THAN 70 milligrams/deciliter  glucagon  Injectable 1 milliGRAM(s) IntraMuscular once PRN Glucose LESS THAN 70 milligrams/deciliter      04-18-20 @ 07:01  -  04-19-20 @ 07:00  --------------------------------------------------------  IN: 2733.4 mL / OUT: 2500 mL / NET: 233.4 mL    04-19-20 @ 07:01  -  04-19-20 @ 16:24  --------------------------------------------------------  IN: 637.4 mL / OUT: 0 mL / NET: 637.4 mL      PHYSICAL EXAM:      T(C): 36.6 (04-19-20 @ 15:00), Max: 37.1 (04-19-20 @ 04:04)  HR: 54 (04-19-20 @ 15:00) (42 - 66)  BP: 115/62 (04-19-20 @ 15:00) (105/51 - 149/71)  RR: 32 (04-19-20 @ 15:00) (22 - 32)  SpO2: 94% (04-19-20 @ 15:00) (83% - 98%)  Wt(kg): --    Extremities:   edema                                    9.9    15.69 )-----------( 257      ( 19 Apr 2020 08:35 )             30.2     04-19    142  |  102  |  128<H>  ----------------------------<  261<H>  4.0   |  24  |  8.15<H>    Ca    8.2<L>      19 Apr 2020 08:35  Phos  10.1     04-19  Mg     3.5     04-19    TPro  6.6  /  Alb  2.2<L>  /  TBili  0.6  /  DBili  x   /  AST  101<H>  /  ALT  219<H>  /  AlkPhos  68  04-18    ABG - ( 18 Apr 2020 12:22 )  pH, Arterial: x     pH, Blood: 7.22  /  pCO2: 54    /  pO2: 84    / HCO3: 22    / Base Excess: -5.8  /  SaO2: 95                LIVER FUNCTIONS - ( 18 Apr 2020 03:33 )  Alb: 2.2 g/dL / Pro: 6.6 gm/dL / ALK PHOS: 68 U/L / ALT: 219 U/L / AST: 101 U/L / GGT: x           Creatinine Trend: 8.15<--, 8.33<--, 8.22<--, 6.93<--, 6.11<--, 6.07<--  Mode: AC/ CMV (Assist Control/ Continuous Mandatory Ventilation)  RR (machine): 32  TV (machine): 470  FiO2: 70  PEEP: 15  ITime: 1  MAP: 23  PIP: 37      Assessment   JOHN septic shock, ischemic ATN, risk for COVID related injury  Non oliguric    Plan  Continue diuretic challenge with IVP lasix followed by drip 20 mg /hr  Will follow closely for HD indications   Add phoslo  Prognosis guarded        Gerard Machado MD

## 2020-04-19 NOTE — PROGRESS NOTE ADULT - SUBJECTIVE AND OBJECTIVE BOX
CC: Patient is a 74y old  Male who presents with a chief complaint of Shortness of Breath (17 Apr 2020 20:34)      HPI:  Patient is a 74 year old male with PMHx HTN HLD DM COPD CABG obese who presents to the ED with shortness of breath. As per ED provider patient presented with marked tachypnea. Satting 92% on NRB but tachypneic with RR of 60+. Patient intubated 4/13 for impending respiratory failure. Writer unable to reach NOK for more collateral information. (13 Apr 2020 18:40).    O/N: no acute events    ## ROS:  unable to assess ros due to mental status   ## EXAM  FOLLOW UP:    Tele: Sinus Bradycardia     Vital Signs Last 24 Hrs  T(C): 36.3 (19 Apr 2020 07:40), Max: 37.1 (19 Apr 2020 04:04)  T(F): 97.3 (19 Apr 2020 07:40), Max: 98.7 (19 Apr 2020 04:04)  HR: 57 (19 Apr 2020 12:00) (42 - 66)  BP: 113/62 (19 Apr 2020 12:00) (105/51 - 149/71)  BP(mean): 74 (19 Apr 2020 12:00) (63 - 91)  RR: 25 (19 Apr 2020 12:00) (22 - 32)  SpO2: 95% (19 Apr 2020 12:00) (83% - 98%)  I&O's Summary    18 Apr 2020 07:01  -  19 Apr 2020 07:00  --------------------------------------------------------  IN: 2733.4 mL / OUT: 2500 mL / NET: 233.4 mL    19 Apr 2020 07:01  -  19 Apr 2020 13:30  --------------------------------------------------------  IN: 275.1 mL / OUT: 0 mL / NET: 275.1 mL          MEDICATIONS:  furosemide Infusion 20 mG/Hr IV Continuous <Continuous>  heparin  Infusion 1100 Unit(s)/Hr IV Continuous <Continuous>  metolazone 10 milliGRAM(s) Oral <User Schedule>  midodrine 20 milliGRAM(s) Oral every 8 hours  levoFLOXacin IVPB      levoFLOXacin IVPB 250 milliGRAM(s) IV Intermittent every 48 hours  aspirin 325 milliGRAM(s) Oral daily  fentaNYL   Infusion. 0.5 MICROgram(s)/kG/Hr IV Continuous <Continuous>  propofol Infusion 10 MICROgram(s)/kG/Min IV Continuous <Continuous>  pantoprazole  Injectable 40 milliGRAM(s) IV Push daily  insulin glargine Injectable (LANTUS) 5 Unit(s) SubCutaneous at bedtime  insulin lispro (HumaLOG) corrective regimen sliding scale   SubCutaneous every 6 hours  methylPREDNISolone sodium succinate Injectable 80 milliGRAM(s) IV Push every 12 hours  vasopressin Infusion 0.04 Unit(s)/Min IV Continuous <Continuous>  chlorhexidine 0.12% Liquid 15 milliLiter(s) Oral Mucosa every 12 hours      CON : NAD  EENT : EOMI, MMM  NECK : Full ROM  RESP : CTAB no increased WOB  CARD : rrr no m/r/g  ABD : S NT ND NABS no rebound  EXT : No edema  NEURO : intubated and sedated      MICROBIOLOGY/CULTURES:  Culture Results:   >100,000 CFU/ml Enterococcus faecalis (04-14 @ 18:16)  Culture Results:   No growth to date. (04-14 @ 00:51)  Culture Results:   Growth in anaerobic bottle: Staphylococcus epidermidis  Coag Negative Staphylococcus  Single set isolate, possible contaminant. Contact  Microbiology if susceptibility testing clinically  indicated.  "Due to technical problems, Proteus sp. will Notbe reported as part of  the BCID panel until further notice"  ***Blood Panel PCR results on this specimen are available  approximately 3 hours after the Gram stain result.***  Gram stain, PCR, and/or culture results may not always  correspond due to difference in methodologies.  ************************************************************  This PCR assay was performed using [employer].  The following targets are tested for: Enterococcus,  vancomycin resistant enterococci, Listeria monocytogenes,  coagulase negative staphylococci, S. aureus,  methicillin resistant S. aureus, Streptococcus agalactiae  (Group B), S. pneumoniae, S. pyogenes (Group A),  Acinetobacter baumannii, Enterobacter cloacae, E. coli,  Klebsiella oxytoca, K. pneumoniae, Proteus sp.,  Serratia marcescens, Haemophilus influenzae,  Neisseria meningitidis, Pseudomonas aeruginosa, Candida  albicans, C. glabrata, C krusei, C parapsilosis,  C. tropicalis and the KPC resistance gene. (04-14 @ 00:51)          ## Daily Issues  - Analgesia: N/A  - Sedation: prop  - HOB elevation: Y  - GI ppx (ulcers): ppi  - DVT ppx: Hep SC  - Nutrition: NPO w/ tube feeds  - Central line: Y  - Hale: Y      ## Code status:  Goals of care discussion: [x] yes [ ] no  [x] full code  [ ] DNR  [ ] DNI  [ ] MOLST      LABS:	 	    CBC Full  -  ( 19 Apr 2020 08:35 )  WBC Count : 15.69 K/uL  Hemoglobin : 9.9 g/dL  Hematocrit : 30.2 %  Platelet Count - Automated : 257 K/uL  Mean Cell Volume : 93.2 fl  Mean Cell Hemoglobin : 30.6 pg  Mean Cell Hemoglobin Concentration : 32.8 gm/dL  Auto Neutrophil # : x  Auto Lymphocyte # : x  Auto Monocyte # : x  Auto Eosinophil # : x  Auto Basophil # : x  Auto Neutrophil % : x  Auto Lymphocyte % : x  Auto Monocyte % : x  Auto Eosinophil % : x  Auto Basophil % : x    04-19    142  |  102  |  128<H>  ----------------------------<  261<H>  4.0   |  24  |  8.15<H>  04-18    141  |  102  |  103<H>  ----------------------------<  267<H>  4.1   |  23  |  8.33<H>    Ca    8.2<L>      19 Apr 2020 08:35  Ca    7.6<L>      18 Apr 2020 03:33  Phos  10.1     04-19  Phos  9.3     04-18  Mg     3.5     04-19  Mg     3.2     04-18    TPro  6.6  /  Alb  2.2<L>  /  TBili  0.6  /  DBili  x   /  AST  101<H>  /  ALT  219<H>  /  AlkPhos  68  04-18

## 2020-04-20 NOTE — PROGRESS NOTE ADULT - SUBJECTIVE AND OBJECTIVE BOX
[facility] NEPHROLOGY SERVICES, [facility]  NEPHROLOGY AND HYPERTENSION  [address]  [address]  [place] [address]  [phone]    MD JEET OLIVAREZ MD ANDREY GONCHARUK, MD MADHU KORRAPATI, MD YELENA ROSENBERG, MD BINNY KOSHY, MD CHRISTOPHER CAPUTO, MD EDWARD BOVER, MD          Patient events noted    MEDICATIONS  (STANDING):  anakinra Injectable   SubCutaneous   aspirin 325 milliGRAM(s) Oral daily  calcium acetate 2001 milliGRAM(s) Oral every 8 hours  chlorhexidine 0.12% Liquid 15 milliLiter(s) Oral Mucosa every 12 hours  chlorhexidine 4% Liquid 1 Application(s) Topical <User Schedule>  cisatracurium Infusion 3 MICROgram(s)/kG/Min (21.4 mL/Hr) IV Continuous <Continuous>  dextrose 5%. 1000 milliLiter(s) (50 mL/Hr) IV Continuous <Continuous>  dextrose 5%. 1000 milliLiter(s) (50 mL/Hr) IV Continuous <Continuous>  dextrose 5%. 1000 milliLiter(s) (50 mL/Hr) IV Continuous <Continuous>  dextrose 5%. 1000 milliLiter(s) (50 mL/Hr) IV Continuous <Continuous>  dextrose 50% Injectable 12.5 Gram(s) IV Push once  dextrose 50% Injectable 25 Gram(s) IV Push once  dextrose 50% Injectable 25 Gram(s) IV Push once  fentaNYL   Infusion. 0.5 MICROgram(s)/kG/Hr (5.67 mL/Hr) IV Continuous <Continuous>  furosemide Infusion 30 mG/Hr (15 mL/Hr) IV Continuous <Continuous>  heparin  Infusion. 1200 Unit(s)/Hr (12 mL/Hr) IV Continuous <Continuous>  insulin glargine Injectable (LANTUS) 5 Unit(s) SubCutaneous at bedtime  insulin lispro (HumaLOG) corrective regimen sliding scale   SubCutaneous every 6 hours  metolazone 10 milliGRAM(s) Oral <User Schedule>  pantoprazole  Injectable 40 milliGRAM(s) IV Push daily  polyethylene glycol 3350 17 Gram(s) Oral daily  propofol Infusion 10 MICROgram(s)/kG/Min (6.8 mL/Hr) IV Continuous <Continuous>  vasopressin Infusion 0.04 Unit(s)/Min (2.4 mL/Hr) IV Continuous <Continuous>    MEDICATIONS  (PRN):  dextrose 40% Gel 15 Gram(s) Oral once PRN Blood Glucose LESS THAN 70 milliGRAM(s)/deciliter  glucagon  Injectable 1 milliGRAM(s) IntraMuscular once PRN Glucose LESS THAN 70 milligrams/deciliter  glucagon  Injectable 1 milliGRAM(s) IntraMuscular once PRN Glucose LESS THAN 70 milligrams/deciliter  glucagon  Injectable 1 milliGRAM(s) IntraMuscular once PRN Glucose LESS THAN 70 milligrams/deciliter  glucagon  Injectable 1 milliGRAM(s) IntraMuscular once PRN Glucose LESS THAN 70 milligrams/deciliter  heparin   Injectable 6000 Unit(s) IV Push every 6 hours PRN For aPTT less than 40      04-19-20 @ 07:01  -  04-20-20 @ 07:00  --------------------------------------------------------  IN: 1995.4 mL / OUT: 1700 mL / NET: 295.4 mL    04-20-20 @ 07:01  -  04-20-20 @ 20:06  --------------------------------------------------------  IN: 905.5 mL / OUT: 1000 mL / NET: -94.5 mL      PHYSICAL EXAM:      T(C): 35.6 (04-20-20 @ 19:09), Max: 36.8 (04-20-20 @ 08:12)  HR: 52 (04-20-20 @ 18:00) (44 - 60)  BP: 109/68 (04-20-20 @ 18:00) (108/67 - 126/75)  RR: 32 (04-20-20 @ 18:00) (26 - 32)  SpO2: 98% (04-20-20 @ 18:00) (92% - 99%)  Wt(kg): --    Extremities:   edema                                    9.7    18.87 )-----------( 274      ( 20 Apr 2020 03:35 )             29.8     04-20    140  |  97  |  148<H>  ----------------------------<  202<H>  3.8   |  23  |  8.05<H>    Ca    8.4<L>      20 Apr 2020 03:35  Phos  11.5     04-20  Mg     3.6     04-20      ABG - ( 20 Apr 2020 12:53 )  pH, Arterial: x     pH, Blood: 7.24  /  pCO2: 53    /  pO2: 83    / HCO3: 24    / Base Excess: -2.5  /  SaO2: 94                  Creatinine Trend: 8.05<--, 8.15<--, 8.33<--, 8.22<--, 6.93<--, 6.11<--  Mode: AC/ CMV (Assist Control/ Continuous Mandatory Ventilation)  RR (machine): 32  TV (machine): 500  FiO2: 100  PEEP: 16  ITime: 1  MAP: 25  PIP: 40    Assessment   JOHN septic shock, ischemic ATN, risk for COVID related injury  Non oliguric    Plan  Continue diuretic challenge with IV lasix followed to drip 30 mg /hr  Will follow closely for HD indications   Add phoslo  Prognosis guarded  Discussed with PCCM team      Gerard Machado MD

## 2020-04-20 NOTE — PROGRESS NOTE ADULT - SUBJECTIVE AND OBJECTIVE BOX
Interval Events:    REVIEW OF SYSTEMS:  [ x] Unable to assess ROS because intubated    OBJECTIVE:  ICU Vital Signs Last 24 Hrs  T(C): 36.8 (20 Apr 2020 08:12), Max: 36.8 (20 Apr 2020 08:12)  T(F): 98.2 (20 Apr 2020 08:12), Max: 98.2 (20 Apr 2020 08:12)  HR: 52 (20 Apr 2020 07:30) (44 - 66)  BP: 118/73 (20 Apr 2020 07:30) (105/51 - 134/61)  BP(mean): 83 (20 Apr 2020 07:30) (63 - 109)  ABP: --  ABP(mean): --  RR: 32 (20 Apr 2020 07:30) (24 - 32)  SpO2: 96% (20 Apr 2020 07:30) (83% - 98%)    Mode: AC/ CMV (Assist Control/ Continuous Mandatory Ventilation), RR (machine): 32, TV (machine): 500, FiO2: 100, PEEP: 16, ITime: 1, MAP: 25, PIP: 39    04-19 @ 07:01  -  04-20 @ 07:00  --------------------------------------------------------  IN: 1995.4 mL / OUT: 1700 mL / NET: 295.4 mL      CAPILLARY BLOOD GLUCOSE      POCT Blood Glucose.: 194 mg/dL (20 Apr 2020 04:58)        LINES:    HOSPITAL MEDICATIONS:  MEDICATIONS  (STANDING):  anakinra Injectable   SubCutaneous   aspirin 325 milliGRAM(s) Oral daily  calcium acetate 2001 milliGRAM(s) Oral every 8 hours  chlorhexidine 0.12% Liquid 15 milliLiter(s) Oral Mucosa every 12 hours  chlorhexidine 4% Liquid 1 Application(s) Topical <User Schedule>  cisatracurium Infusion 3 MICROgram(s)/kG/Min (21.4 mL/Hr) IV Continuous <Continuous>  dextrose 5%. 1000 milliLiter(s) (50 mL/Hr) IV Continuous <Continuous>  dextrose 5%. 1000 milliLiter(s) (50 mL/Hr) IV Continuous <Continuous>  dextrose 5%. 1000 milliLiter(s) (50 mL/Hr) IV Continuous <Continuous>  dextrose 5%. 1000 milliLiter(s) (50 mL/Hr) IV Continuous <Continuous>  dextrose 50% Injectable 12.5 Gram(s) IV Push once  dextrose 50% Injectable 25 Gram(s) IV Push once  dextrose 50% Injectable 25 Gram(s) IV Push once  fentaNYL   Infusion. 0.5 MICROgram(s)/kG/Hr (5.67 mL/Hr) IV Continuous <Continuous>  furosemide Infusion 20 mG/Hr (10 mL/Hr) IV Continuous <Continuous>  heparin  Infusion 1100 Unit(s)/Hr (12 mL/Hr) IV Continuous <Continuous>  insulin glargine Injectable (LANTUS) 5 Unit(s) SubCutaneous at bedtime  insulin lispro (HumaLOG) corrective regimen sliding scale   SubCutaneous every 6 hours  levoFLOXacin IVPB      levoFLOXacin IVPB 250 milliGRAM(s) IV Intermittent every 48 hours  methylPREDNISolone sodium succinate Injectable 80 milliGRAM(s) IV Push every 12 hours  metolazone 10 milliGRAM(s) Oral <User Schedule>  pantoprazole  Injectable 40 milliGRAM(s) IV Push daily  propofol Infusion 10 MICROgram(s)/kG/Min (6.8 mL/Hr) IV Continuous <Continuous>  vasopressin Infusion 0.04 Unit(s)/Min (2.4 mL/Hr) IV Continuous <Continuous>    MEDICATIONS  (PRN):  dextrose 40% Gel 15 Gram(s) Oral once PRN Blood Glucose LESS THAN 70 milliGRAM(s)/deciliter  glucagon  Injectable 1 milliGRAM(s) IntraMuscular once PRN Glucose LESS THAN 70 milligrams/deciliter  glucagon  Injectable 1 milliGRAM(s) IntraMuscular once PRN Glucose LESS THAN 70 milligrams/deciliter  glucagon  Injectable 1 milliGRAM(s) IntraMuscular once PRN Glucose LESS THAN 70 milligrams/deciliter  glucagon  Injectable 1 milliGRAM(s) IntraMuscular once PRN Glucose LESS THAN 70 milligrams/deciliter      LABS:                        9.7    18.87 )-----------( 274      ( 20 Apr 2020 03:35 )             29.8     Hgb Trend: 9.7<--, 9.9<--, 10.1<--, 11.5<--, 12.1<--  04-20    140  |  97  |  148<H>  ----------------------------<  202<H>  3.8   |  23  |  8.05<H>    Ca    8.4<L>      20 Apr 2020 03:35  Phos  11.5     04-20  Mg     3.6     04-20      PTT - ( 20 Apr 2020 03:35 )  PTT:58.8 sec    Arterial Blood Gas:  04-18 @ 12:22  --/54/84/22/95/-5.8  ABG lactate: --        MICROBIOLOGY:     RADIOLOGY:  [ ] Reviewed and interpreted by me Interval Events:  high residuals overnight    REVIEW OF SYSTEMS:  [ x] Unable to assess ROS because intubated    OBJECTIVE:  ICU Vital Signs Last 24 Hrs  T(C): 36.8 (20 Apr 2020 08:12), Max: 36.8 (20 Apr 2020 08:12)  T(F): 98.2 (20 Apr 2020 08:12), Max: 98.2 (20 Apr 2020 08:12)  HR: 52 (20 Apr 2020 07:30) (44 - 66)  BP: 118/73 (20 Apr 2020 07:30) (105/51 - 134/61)  BP(mean): 83 (20 Apr 2020 07:30) (63 - 109)  ABP: --  ABP(mean): --  RR: 32 (20 Apr 2020 07:30) (24 - 32)  SpO2: 96% (20 Apr 2020 07:30) (83% - 98%)    Mode: AC/ CMV (Assist Control/ Continuous Mandatory Ventilation), RR (machine): 32, TV (machine): 500, FiO2: 100, PEEP: 16, ITime: 1, MAP: 25, PIP: 39    04-19 @ 07:01  -  04-20 @ 07:00  --------------------------------------------------------  IN: 1995.4 mL / OUT: 1700 mL / NET: 295.4 mL      CAPILLARY BLOOD GLUCOSE      POCT Blood Glucose.: 194 mg/dL (20 Apr 2020 04:58)        LINES:    HOSPITAL MEDICATIONS:  MEDICATIONS  (STANDING):  anakinra Injectable   SubCutaneous   aspirin 325 milliGRAM(s) Oral daily  calcium acetate 2001 milliGRAM(s) Oral every 8 hours  chlorhexidine 0.12% Liquid 15 milliLiter(s) Oral Mucosa every 12 hours  chlorhexidine 4% Liquid 1 Application(s) Topical <User Schedule>  cisatracurium Infusion 3 MICROgram(s)/kG/Min (21.4 mL/Hr) IV Continuous <Continuous>  dextrose 5%. 1000 milliLiter(s) (50 mL/Hr) IV Continuous <Continuous>  dextrose 5%. 1000 milliLiter(s) (50 mL/Hr) IV Continuous <Continuous>  dextrose 5%. 1000 milliLiter(s) (50 mL/Hr) IV Continuous <Continuous>  dextrose 5%. 1000 milliLiter(s) (50 mL/Hr) IV Continuous <Continuous>  dextrose 50% Injectable 12.5 Gram(s) IV Push once  dextrose 50% Injectable 25 Gram(s) IV Push once  dextrose 50% Injectable 25 Gram(s) IV Push once  fentaNYL   Infusion. 0.5 MICROgram(s)/kG/Hr (5.67 mL/Hr) IV Continuous <Continuous>  furosemide Infusion 20 mG/Hr (10 mL/Hr) IV Continuous <Continuous>  heparin  Infusion 1100 Unit(s)/Hr (12 mL/Hr) IV Continuous <Continuous>  insulin glargine Injectable (LANTUS) 5 Unit(s) SubCutaneous at bedtime  insulin lispro (HumaLOG) corrective regimen sliding scale   SubCutaneous every 6 hours  levoFLOXacin IVPB      levoFLOXacin IVPB 250 milliGRAM(s) IV Intermittent every 48 hours  methylPREDNISolone sodium succinate Injectable 80 milliGRAM(s) IV Push every 12 hours  metolazone 10 milliGRAM(s) Oral <User Schedule>  pantoprazole  Injectable 40 milliGRAM(s) IV Push daily  propofol Infusion 10 MICROgram(s)/kG/Min (6.8 mL/Hr) IV Continuous <Continuous>  vasopressin Infusion 0.04 Unit(s)/Min (2.4 mL/Hr) IV Continuous <Continuous>    MEDICATIONS  (PRN):  dextrose 40% Gel 15 Gram(s) Oral once PRN Blood Glucose LESS THAN 70 milliGRAM(s)/deciliter  glucagon  Injectable 1 milliGRAM(s) IntraMuscular once PRN Glucose LESS THAN 70 milligrams/deciliter  glucagon  Injectable 1 milliGRAM(s) IntraMuscular once PRN Glucose LESS THAN 70 milligrams/deciliter  glucagon  Injectable 1 milliGRAM(s) IntraMuscular once PRN Glucose LESS THAN 70 milligrams/deciliter  glucagon  Injectable 1 milliGRAM(s) IntraMuscular once PRN Glucose LESS THAN 70 milligrams/deciliter      LABS:                        9.7    18.87 )-----------( 274      ( 20 Apr 2020 03:35 )             29.8     Hgb Trend: 9.7<--, 9.9<--, 10.1<--, 11.5<--, 12.1<--  04-20    140  |  97  |  148<H>  ----------------------------<  202<H>  3.8   |  23  |  8.05<H>    Ca    8.4<L>      20 Apr 2020 03:35  Phos  11.5     04-20  Mg     3.6     04-20      PTT - ( 20 Apr 2020 03:35 )  PTT:58.8 sec    Arterial Blood Gas:  04-18 @ 12:22  --/54/84/22/95/-5.8  ABG lactate: --        MICROBIOLOGY:     RADIOLOGY:  [ ] Reviewed and interpreted by me

## 2020-04-21 NOTE — PROGRESS NOTE ADULT - REASON FOR ADMISSION
Shortness of Breath

## 2020-04-21 NOTE — PROGRESS NOTE ADULT - SUBJECTIVE AND OBJECTIVE BOX
[facility] NEPHROLOGY SERVICES, [facility]  NEPHROLOGY AND HYPERTENSION  [address]  [address]  [place] [address]  [phone]    MD JEET OLIVAREZ MD ANDREY GONCHARUK, MD MADHU KORRAPATI, MD YELENA ROSENBERG, MD BINNY KOSHY, MD CHRISTOPHER CAPUTO, MD EDWARD BOVER, MD          Patient events noted  No distress      MEDICATIONS  (STANDING):  anakinra Injectable   SubCutaneous   aspirin 325 milliGRAM(s) Oral daily  calcium acetate 2001 milliGRAM(s) Oral every 8 hours  chlorhexidine 0.12% Liquid 15 milliLiter(s) Oral Mucosa every 12 hours  chlorhexidine 4% Liquid 1 Application(s) Topical <User Schedule>  cisatracurium Infusion 3 MICROgram(s)/kG/Min (21.4 mL/Hr) IV Continuous <Continuous>  dextrose 5%. 1000 milliLiter(s) (50 mL/Hr) IV Continuous <Continuous>  dextrose 5%. 1000 milliLiter(s) (50 mL/Hr) IV Continuous <Continuous>  dextrose 5%. 1000 milliLiter(s) (50 mL/Hr) IV Continuous <Continuous>  dextrose 5%. 1000 milliLiter(s) (50 mL/Hr) IV Continuous <Continuous>  dextrose 50% Injectable 12.5 Gram(s) IV Push once  dextrose 50% Injectable 25 Gram(s) IV Push once  dextrose 50% Injectable 25 Gram(s) IV Push once  fentaNYL   Infusion. 0.5 MICROgram(s)/kG/Hr (5.94 mL/Hr) IV Continuous <Continuous>  furosemide Infusion 30 mG/Hr (15 mL/Hr) IV Continuous <Continuous>  heparin  Infusion. 1200 Unit(s)/Hr (12 mL/Hr) IV Continuous <Continuous>  insulin glargine Injectable (LANTUS) 5 Unit(s) SubCutaneous at bedtime  insulin lispro (HumaLOG) corrective regimen sliding scale   SubCutaneous every 6 hours  metolazone 10 milliGRAM(s) Oral <User Schedule>  pantoprazole  Injectable 40 milliGRAM(s) IV Push daily  polyethylene glycol 3350 17 Gram(s) Oral daily  propofol Infusion 10 MICROgram(s)/kG/Min (6.8 mL/Hr) IV Continuous <Continuous>  senna 2 Tablet(s) Oral at bedtime  vasopressin Infusion 0.04 Unit(s)/Min (2.4 mL/Hr) IV Continuous <Continuous>    MEDICATIONS  (PRN):  bisacodyl Suppository 10 milliGRAM(s) Rectal daily PRN Constipation  dextrose 40% Gel 15 Gram(s) Oral once PRN Blood Glucose LESS THAN 70 milliGRAM(s)/deciliter  glucagon  Injectable 1 milliGRAM(s) IntraMuscular once PRN Glucose LESS THAN 70 milligrams/deciliter  glucagon  Injectable 1 milliGRAM(s) IntraMuscular once PRN Glucose LESS THAN 70 milligrams/deciliter  glucagon  Injectable 1 milliGRAM(s) IntraMuscular once PRN Glucose LESS THAN 70 milligrams/deciliter  glucagon  Injectable 1 milliGRAM(s) IntraMuscular once PRN Glucose LESS THAN 70 milligrams/deciliter  heparin   Injectable 6000 Unit(s) IV Push every 6 hours PRN For aPTT less than 40      04-20-20 @ 07:01  -  04-21-20 @ 07:00  --------------------------------------------------------  IN: 1853.7 mL / OUT: 1850 mL / NET: 3.7 mL    04-21-20 @ 07:01  -  04-21-20 @ 14:23  --------------------------------------------------------  IN: 264.5 mL / OUT: 0 mL / NET: 264.5 mL      PHYSICAL EXAM:      T(C): 35.4 (04-21-20 @ 11:30), Max: 36.3 (04-21-20 @ 03:18)  HR: 76 (04-21-20 @ 13:30) (49 - 76)  BP: 95/64 (04-21-20 @ 13:30) (95/64 - 117/76)  RR: 32 (04-21-20 @ 13:30) (13 - 32)  SpO2: 92% (04-21-20 @ 13:30) (78% - 100%)  Wt(kg): --    Extremities:   edema                                    10.1   21.09 )-----------( 292      ( 21 Apr 2020 03:42 )             31.0     04-21    139  |  93<L>  |  169<H>  ----------------------------<  200<H>  3.6   |  24  |  8.05<H>    Ca    8.9      21 Apr 2020 03:42  Phos  14.0     04-21  Mg     3.9     04-21    TPro  6.7  /  Alb  2.2<L>  /  TBili  0.6  /  DBili  x   /  AST  16  /  ALT  75  /  AlkPhos  61  04-21    ABG - ( 20 Apr 2020 12:53 )  pH, Arterial: x     pH, Blood: 7.24  /  pCO2: 53    /  pO2: 83    / HCO3: 24    / Base Excess: -2.5  /  SaO2: 94                LIVER FUNCTIONS - ( 21 Apr 2020 03:42 )  Alb: 2.2 g/dL / Pro: 6.7 gm/dL / ALK PHOS: 61 U/L / ALT: 75 U/L / AST: 16 U/L / GGT: x           Creatinine Trend: 8.05<--, 8.05<--, 8.15<--, 8.33<--, 8.22<--, 6.93<--  Mode: AC/ CMV (Assist Control/ Continuous Mandatory Ventilation)  RR (machine): 32  TV (machine): 500  FiO2: 100  PEEP: 16  ITime: 1  MAP: 26  PIP: 42    Assessment   JOHN septic shock, ischemic ATN, risk for COVID related injury  Non oliguric however increasing BUN>Cr;  FIO2 100%    Plan  Will initiate HD today; 2 hrs; UF as tolerated by MAP;   Hold Lasix drip to maximize MAP  Added phoslo  Discussed with daughter Isha goals, potential benefits and risks of hemodialysis; she agreed with the plan.  All questions answered;   Discussed with PCCM team; prognosis guarded        Gerard Machado MD

## 2020-04-21 NOTE — PROCEDURE NOTE - NSPOSTPRCRAD_GEN_A_CORE
post procedure radiography not performed
SVC/post-procedure radiography performed/central line located in the/no pneumothorax

## 2020-04-21 NOTE — PROGRESS NOTE ADULT - SUBJECTIVE AND OBJECTIVE BOX
Interval Events:    REVIEW OF SYSTEMS:  [ x] Unable to assess ROS because intubated    OBJECTIVE:  ICU Vital Signs Last 24 Hrs  T(C): 36.3 (21 Apr 2020 03:18), Max: 36.3 (21 Apr 2020 03:18)  T(F): 97.3 (21 Apr 2020 03:18), Max: 97.3 (21 Apr 2020 03:18)  HR: 54 (21 Apr 2020 06:30) (47 - 60)  BP: 117/76 (21 Apr 2020 06:30) (104/68 - 126/75)  BP(mean): 85 (21 Apr 2020 06:30) (75 - 87)  ABP: --  ABP(mean): --  RR: 32 (21 Apr 2020 06:30) (28 - 32)  SpO2: 96% (21 Apr 2020 06:30) (95% - 100%)    Mode: AC/ CMV (Assist Control/ Continuous Mandatory Ventilation), RR (machine): 32, TV (machine): 500, FiO2: 100, PEEP: 16, ITime: 1, MAP: 26, PIP: 40    04-20 @ 07:01  -  04-21 @ 07:00  --------------------------------------------------------  IN: 1853.7 mL / OUT: 1850 mL / NET: 3.7 mL      CAPILLARY BLOOD GLUCOSE      POCT Blood Glucose.: 187 mg/dL (21 Apr 2020 06:42)        LINES:    HOSPITAL MEDICATIONS:  MEDICATIONS  (STANDING):  anakinra Injectable   SubCutaneous   aspirin 325 milliGRAM(s) Oral daily  calcium acetate 2001 milliGRAM(s) Oral every 8 hours  chlorhexidine 0.12% Liquid 15 milliLiter(s) Oral Mucosa every 12 hours  chlorhexidine 4% Liquid 1 Application(s) Topical <User Schedule>  cisatracurium Infusion 3 MICROgram(s)/kG/Min (21.4 mL/Hr) IV Continuous <Continuous>  dextrose 5%. 1000 milliLiter(s) (50 mL/Hr) IV Continuous <Continuous>  dextrose 5%. 1000 milliLiter(s) (50 mL/Hr) IV Continuous <Continuous>  dextrose 5%. 1000 milliLiter(s) (50 mL/Hr) IV Continuous <Continuous>  dextrose 5%. 1000 milliLiter(s) (50 mL/Hr) IV Continuous <Continuous>  dextrose 50% Injectable 12.5 Gram(s) IV Push once  dextrose 50% Injectable 25 Gram(s) IV Push once  dextrose 50% Injectable 25 Gram(s) IV Push once  fentaNYL   Infusion. 0.5 MICROgram(s)/kG/Hr (5.94 mL/Hr) IV Continuous <Continuous>  furosemide Infusion 30 mG/Hr (15 mL/Hr) IV Continuous <Continuous>  heparin  Infusion. 1200 Unit(s)/Hr (12 mL/Hr) IV Continuous <Continuous>  insulin glargine Injectable (LANTUS) 5 Unit(s) SubCutaneous at bedtime  insulin lispro (HumaLOG) corrective regimen sliding scale   SubCutaneous every 6 hours  metolazone 10 milliGRAM(s) Oral <User Schedule>  pantoprazole  Injectable 40 milliGRAM(s) IV Push daily  polyethylene glycol 3350 17 Gram(s) Oral daily  propofol Infusion 10 MICROgram(s)/kG/Min (6.8 mL/Hr) IV Continuous <Continuous>  vasopressin Infusion 0.04 Unit(s)/Min (2.4 mL/Hr) IV Continuous <Continuous>    MEDICATIONS  (PRN):  dextrose 40% Gel 15 Gram(s) Oral once PRN Blood Glucose LESS THAN 70 milliGRAM(s)/deciliter  glucagon  Injectable 1 milliGRAM(s) IntraMuscular once PRN Glucose LESS THAN 70 milligrams/deciliter  glucagon  Injectable 1 milliGRAM(s) IntraMuscular once PRN Glucose LESS THAN 70 milligrams/deciliter  glucagon  Injectable 1 milliGRAM(s) IntraMuscular once PRN Glucose LESS THAN 70 milligrams/deciliter  glucagon  Injectable 1 milliGRAM(s) IntraMuscular once PRN Glucose LESS THAN 70 milligrams/deciliter  heparin   Injectable 6000 Unit(s) IV Push every 6 hours PRN For aPTT less than 40      LABS:                        10.1   21.09 )-----------( 292      ( 21 Apr 2020 03:42 )             31.0     Hgb Trend: 10.1<--, 10.1<--, 9.7<--, 9.9<--, 10.1<--  04-21    139  |  93<L>  |  169<H>  ----------------------------<  200<H>  3.6   |  24  |  8.05<H>    Ca    8.9      21 Apr 2020 03:42  Phos  14.0     04-21  Mg     3.9     04-21    TPro  6.7  /  Alb  2.2<L>  /  TBili  0.6  /  DBili  x   /  AST  16  /  ALT  75  /  AlkPhos  61  04-21    PTT - ( 21 Apr 2020 01:20 )  PTT:68.6 sec    Arterial Blood Gas:  04-20 @ 12:53  --/53/83/24/94/-2.5  ABG lactate: --        MICROBIOLOGY:     RADIOLOGY:  [ ] Reviewed and interpreted by me Interval Events:  having high residuals overnight    REVIEW OF SYSTEMS:  [ x] Unable to assess ROS because intubated    OBJECTIVE:  ICU Vital Signs Last 24 Hrs  T(C): 36.3 (21 Apr 2020 03:18), Max: 36.3 (21 Apr 2020 03:18)  T(F): 97.3 (21 Apr 2020 03:18), Max: 97.3 (21 Apr 2020 03:18)  HR: 54 (21 Apr 2020 06:30) (47 - 60)  BP: 117/76 (21 Apr 2020 06:30) (104/68 - 126/75)  BP(mean): 85 (21 Apr 2020 06:30) (75 - 87)  ABP: --  ABP(mean): --  RR: 32 (21 Apr 2020 06:30) (28 - 32)  SpO2: 96% (21 Apr 2020 06:30) (95% - 100%)    Mode: AC/ CMV (Assist Control/ Continuous Mandatory Ventilation), RR (machine): 32, TV (machine): 500, FiO2: 100, PEEP: 16, ITime: 1, MAP: 26, PIP: 40    04-20 @ 07:01  -  04-21 @ 07:00  --------------------------------------------------------  IN: 1853.7 mL / OUT: 1850 mL / NET: 3.7 mL      CAPILLARY BLOOD GLUCOSE      POCT Blood Glucose.: 187 mg/dL (21 Apr 2020 06:42)        LINES:    HOSPITAL MEDICATIONS:  MEDICATIONS  (STANDING):  anakinra Injectable   SubCutaneous   aspirin 325 milliGRAM(s) Oral daily  calcium acetate 2001 milliGRAM(s) Oral every 8 hours  chlorhexidine 0.12% Liquid 15 milliLiter(s) Oral Mucosa every 12 hours  chlorhexidine 4% Liquid 1 Application(s) Topical <User Schedule>  cisatracurium Infusion 3 MICROgram(s)/kG/Min (21.4 mL/Hr) IV Continuous <Continuous>  dextrose 5%. 1000 milliLiter(s) (50 mL/Hr) IV Continuous <Continuous>  dextrose 5%. 1000 milliLiter(s) (50 mL/Hr) IV Continuous <Continuous>  dextrose 5%. 1000 milliLiter(s) (50 mL/Hr) IV Continuous <Continuous>  dextrose 5%. 1000 milliLiter(s) (50 mL/Hr) IV Continuous <Continuous>  dextrose 50% Injectable 12.5 Gram(s) IV Push once  dextrose 50% Injectable 25 Gram(s) IV Push once  dextrose 50% Injectable 25 Gram(s) IV Push once  fentaNYL   Infusion. 0.5 MICROgram(s)/kG/Hr (5.94 mL/Hr) IV Continuous <Continuous>  furosemide Infusion 30 mG/Hr (15 mL/Hr) IV Continuous <Continuous>  heparin  Infusion. 1200 Unit(s)/Hr (12 mL/Hr) IV Continuous <Continuous>  insulin glargine Injectable (LANTUS) 5 Unit(s) SubCutaneous at bedtime  insulin lispro (HumaLOG) corrective regimen sliding scale   SubCutaneous every 6 hours  metolazone 10 milliGRAM(s) Oral <User Schedule>  pantoprazole  Injectable 40 milliGRAM(s) IV Push daily  polyethylene glycol 3350 17 Gram(s) Oral daily  propofol Infusion 10 MICROgram(s)/kG/Min (6.8 mL/Hr) IV Continuous <Continuous>  vasopressin Infusion 0.04 Unit(s)/Min (2.4 mL/Hr) IV Continuous <Continuous>    MEDICATIONS  (PRN):  dextrose 40% Gel 15 Gram(s) Oral once PRN Blood Glucose LESS THAN 70 milliGRAM(s)/deciliter  glucagon  Injectable 1 milliGRAM(s) IntraMuscular once PRN Glucose LESS THAN 70 milligrams/deciliter  glucagon  Injectable 1 milliGRAM(s) IntraMuscular once PRN Glucose LESS THAN 70 milligrams/deciliter  glucagon  Injectable 1 milliGRAM(s) IntraMuscular once PRN Glucose LESS THAN 70 milligrams/deciliter  glucagon  Injectable 1 milliGRAM(s) IntraMuscular once PRN Glucose LESS THAN 70 milligrams/deciliter  heparin   Injectable 6000 Unit(s) IV Push every 6 hours PRN For aPTT less than 40      LABS:                        10.1   21.09 )-----------( 292      ( 21 Apr 2020 03:42 )             31.0     Hgb Trend: 10.1<--, 10.1<--, 9.7<--, 9.9<--, 10.1<--  04-21    139  |  93<L>  |  169<H>  ----------------------------<  200<H>  3.6   |  24  |  8.05<H>    Ca    8.9      21 Apr 2020 03:42  Phos  14.0     04-21  Mg     3.9     04-21    TPro  6.7  /  Alb  2.2<L>  /  TBili  0.6  /  DBili  x   /  AST  16  /  ALT  75  /  AlkPhos  61  04-21    PTT - ( 21 Apr 2020 01:20 )  PTT:68.6 sec    Arterial Blood Gas:  04-20 @ 12:53  --/53/83/24/94/-2.5  ABG lactate: --        MICROBIOLOGY:     RADIOLOGY:  [ ] Reviewed and interpreted by me

## 2020-04-21 NOTE — CHART NOTE - NSCHARTNOTEFT_GEN_A_CORE
This afternoon pt's saturation dropped to low 80s. CXR had been done recently after RIJ HD catheter placed- no PTX seen. POCUS performed showing lung sliding b/l w/ slightly decreased LV function w/ normal RV size and function and ?small pericardial effusion. HD otherwise unchanged w/ normal BP off all pressors. ABG was performed and A line placed to ensure that sat was accurate. ABG showed PaO2 of 53 and saturation of 77%. Given that he was on maximum vent settings, we decided to prone the patient. As soon as pt was proned, HR dropped to 50s and 60s and we were unable to obtain BP or saturation. Levophed was started. We supined the pt immediately. Unable to obtain BP on A-line or at this time. We pushed epinephrine 1 mg x1. BP transiently increased, as well as HR. HR then morphed into VT and then into VFib. Defibrillation x 1 which changed rhythm to bradycardia, which then again devolved into VFib. At this time, we decided that given underlying medical conditions and current clinical situation with unattainable saturation, which prior had been dropping to the 80s, further ACLS would be medically futile. Pt  at 6:21 PM. Daughter pascale notified.

## 2020-04-21 NOTE — PROCEDURE NOTE - NSPROCDETAILS_GEN_ALL_CORE
sterile dressing applied/sterile technique, catheter placed/ultrasound guidance/guidewire recovered/lumen(s) aspirated and flushed
lumen(s) aspirated and flushed/ultrasound guidance/guidewire recovered/sterile dressing applied/sterile technique, catheter placed

## 2020-04-21 NOTE — PROGRESS NOTE ADULT - ASSESSMENT
Pt currently with hypoxic respiratory failure secondary to SARS-CoV2 (COVID 19) plan to implement Low tidal volume ventilation strategy with 6ml/kg/IBW, will utilize Fio2 and PEEP to obtain P to F ratio >150 ensuring Plateau pressures remain <30 in order to avoid barotruama. Ensure pt is adequately sedated with goal RASS -4 to 5 using propofol and fentanyl . If patients develops increasing oxygen requirements will initiate paralytics and consider prone ventilation for 16hs alternating with 8hrs supine. Will track indices of severity of illness with daily cbc w/ diff for lymphopenia, coags, D-dimer, procal, ESR, CRP, troponin, LDH, Ferritin and troponins. patient currently on anakinra.  WIll treat e faecilis UTI with levoquin.  on vasopressors for shock. midodrine added to help wean pressors. solumedrol q12 for covid tx, asa.    # Neuro  - sedated on propofol and fentanyl  - attempted to wean sedation however patient became dyschronous with ventilator and desated to 80's.    # Respiratory  - Vent settings: AC Volume control 470/32/18/100%.    - Required increased oxygen requirements. May consider paralytics and or prone.  - last Blood Gas Profile - Arterial (04.18.20 @ 12:22) pH, Blood: 7.22 pCO2, 54 mmHg,pO2, Arterial: 84 mmHgHCO3, Arterial: 22 mmol/LOxygen Saturation, Arterial: 95 % FIO2, Arterial: 80    # CV  - hemodynamically stable on midodrine.  Weaned off Vaso.  - Continues to be sinus smith 40-60's    # Renal   - JOHN Cr. slightly improved.  urine output 2500/24hrs.    - Continue lasix gtt. w/ metalozone  - appreciate renal reccs.    # GI  - Decreased bolus feeds Q6/H Glucerna 1.5 fatmata. 270mls  - appreciate Nutrition reccs.  - protonix GI px    # Endocrine  - Increased Lantus for hyperglycemia    # ID  -  Levaquin 250mg Q48/hrs for UTI  - Anakinra taper will be complete tomorrow 4/20  - Solu-Medrol 80mg ivp Q12/h.  Have been on steroids since admission 4/13.    # Heme  - Hep gtt. for elevated D-dimer.  Full A/C with goals towards higher end of aPTT  - ASA 325mg daily  - H/H stable    # Skin  - no issues    # DVT px  - Hep gtt
Pt currently with hypoxic respiratory failure secondary to SARS-CoV2 (COVID 19) plan to implement Low tidal volume ventilation strategy with 6ml/kg/IBW, will utilize Fio2 and PEEP to obtain P to F ratio >150 ensuring Plateau pressures remain <30 in order to avoid baurotruama. Ensure pt is adequately sedated with goal RASS -4 to 5 using propofol and fentanyl . If patients develops increasing oxygen requirements will initiate paralytics and consider prone ventilation for 16hs alternating with 8hrs supine. Will track indices of severity of illness with daily cbc w/ diff for lymphopenia, coags, D-dimer, procal, ESR, CRP, troponin, LDH, Ferritin and troponins. patient currently on anakinra.  WIll treat e faecilis UTI with levoquin.  on vasopressors for shock. midodrine added to help wean pressors. solumedrol q12 for covid tx, asa    I have decided to review and order of clinical lab tests  Relevant radiology studies were reviewed  Decision made to obtain available old records  Discussion of test results with performing physician  Review and summarization of old records obtaining history from EMR   discussion of case with another health care provider ICUNP  I have visualized independent imaging, EKGs and radiologies studies available but not otherwise officially read  Patient is critically ill and could decompensate imminently.  The patient required immediate attention  The medically necessary treatment decisions were required due to possibility of imminent threat to the patient  the patient is unable  to participate in giving history and/or making treatment decisions;     ccm 40 min
Pt currently with hypoxic respiratory failure secondary to SARS-CoV2 (COVID 19) plan to implement Low tidal volume ventilation strategy with 6ml/kg/IBW, will utilize Fio2 and PEEP to obtain P to F ratio >150 ensuring Plateau pressures remain <30 in order to avoid baurotruama. Ensure pt is adequately sedated with goal RASS -4 to 5 using propofol and fentanyl . If patients develops increasing oxygen requirements will initiate paralytics and consider prone ventilation for 16hs alternating with 8hrs supine. Will track indices of severity of illness with daily cbc w/ diff for lymphopenia, coags, D-dimer, procal, ESR, CRP, troponin, LDH, Ferritin and troponins. patient currently on anakinra.  WIll treat e faecilis UTI with levoquin.  on vasopressors for shock. midodrine added to help wean pressors. solumedrol q12 for covid tx, asa.    I have decided to review and order of clinical lab tests  Relevant radiology studies were reviewed  Decision made to obtain available old records  Discussion of test results with performing physician  Review and summarization of old records obtaining history from EMR   discussion of case with another health care provider ICUNP  I have visualized independent imaging, EKGs and radiologies studies available but not otherwise officially read  Patient is critically ill and could decompensate imminently.  The patient required immediate attention  The medically necessary treatment decisions were required due to possibility of imminent threat to the patient  the patient is unable  to participate in giving history and/or making treatment decisions;     ccm 35 min
Pt is a 73 yo obese M with h/o COPD, CABG, HTN,  HLD and DM admitted to the ICU with 1) Hypoxic resp failure failure requiring intubation 2 to Covid-19 PNA 2) Septic shock 3) JOHN    Resp: Adjust MV to ABG and O2 sat  ID: Steroids + Heparin drip + Anakirna  CVS: Pressors to maintain MAP  > 65  FEN: Start early enteral feeds  Endo: Adjust Lantus + Lispro to FS  Renal: Follow BUN/Cr and UO; cont diuresis and f/u as per Renal  Neuro: Sedate to put pt in sync with MV
74M w/ HTN, HLD, DM, COPD, s/p CABG, obesity. Presents w/ SOB, intubated in the ED for hypoxic respiratory failure. Found to be COVID19+. Course complicated by ARDS, JOHN and UTI.     - ARDS low VT ventilation, tirate FiO2 and PEEP, f/u ABG  - continue w/ deep sedation and paralysis for severe ARDS  - on anakinra and  s/p solumedrol 1 mg/kg/day for severe COVID19  - monitor COVID19 disease severity indexes  - JOHN likely COVID related ATN, BUN/Cr continues to rise, non-oliguric, on metolazone and lasix gtt; renal following for possible HD  - high residuals continue, has not had BM; will add suppository, if no BM will consider methynaltrexone since he has been on fentanyl, will keep NPO today and monitor  - s/p levofloaxcin for E. faecalis UTI  - check procal in AM  - midodrine to wean pressors, bradycardia noted
74M w/ HTN, HLD, DM, COPD, s/p CABG, obesity. Presents w/ SOB, intubated in the ED for hypoxic respiratory failure. Found to be COVID19+. Course complicated by ARDS, JOHN and UTI.     - ARDS low VT ventilation, tirate FiO2 and PEEP, f/u ABG  - continue w/ deep sedation and paralysis for severe ARDS  - on anakinra and  s/p solumedrol 1 mg/kg/day for severe COVID19  - monitor COVID19 disease severity indexes  - JOHN likely COVID related ATN, BUN/Cr continues to rise, non-oliguric, on metolazone and lasix gtt- will increase rate and monitor UOP and electrolytes; will likely need HD in near future  - high residuals noted, will keep NPO today and monitor; start bowel regimen  - d/c levofloaxcin- received 4 days worth of tx for E. faecalis UTI  - midodrine to wean pressors, bradycardia noted

## 2020-04-21 NOTE — DISCHARGE NOTE FOR THE EXPIRED PATIENT - HOSPITAL COURSE
74M w/ HTN, HLD, DM, COPD, s/p CABG, obesity. Presents w/ SOB, intubated in the ED for hypoxic respiratory failure. Found to be COVID19+. Course complicated by ARDS, JOHN and UTI. Today, patient planned for hemodialysis. ANTWAN palmer placed. Pt acutely desat to 78% on 100% FIO2/PEEP 16. Right radial arterial line placed. Decision was made to prone patient. Pt proned and after several minutes, pt became hypotensive for which levophed was started and Grzegorz 5cc pushed. Pt was flipped back supine. Pt PEA arrest 1819, for which epix1 given. Pt then went into Vtach then Vfib and was shocked x1. Pt became asystole at 1821, and to a reasonable degree of medical certainty, escalation of treatment would offer the patient no medical benefit. If further escalation of treatment were to be provided, the provision of the intervention or treatment under the circumstances would violate accepted medical standards and would be an extraordinary burden to the patient. Family notified

## 2020-04-21 NOTE — PROCEDURE NOTE - NSICDXPROCEDURE_GEN_ALL_CORE_FT
PROCEDURES:  Insertion, catheter, non-tunneled, age 5 years or older 22-Apr-2020 13:24:47  Logan Cabrales  Portable ultrasound guidance for vascular access 22-Apr-2020 13:24:14  Logan Cabrales

## 2020-04-22 LAB
HBV SURFACE AB SER-ACNC: 44.9 MIU/ML — SIGNIFICANT CHANGE UP
HBV SURFACE AG SER-ACNC: SIGNIFICANT CHANGE UP
HCV AB S/CO SERPL IA: 0.33 S/CO — SIGNIFICANT CHANGE UP (ref 0–0.99)
HCV AB SERPL-IMP: SIGNIFICANT CHANGE UP

## 2021-02-22 NOTE — PROGRESS NOTE ADULT - PROBLEM SELECTOR PROBLEM 5
Type 2 diabetes mellitus without complication, without long-term current use of insulin Acute systolic heart failure Transportation Risk (There is always a risk of traffic delays resulting in deterioration of condition.)

## 2024-08-30 NOTE — ED PROVIDER NOTE - CADM POA PRESS ULCER
Note from pain management states the want to avoid muscle relaxers and opioids. They will not fill, do you want to fill lesser mg?  
Pharmacy called and wants to know if you can do Norco 5/325mg instead of Norco 10/325mg because patient told pharmacy he only takes 1 or 2 every couple weeks and they dont feel comfortable giving him the higher dose since he doesn't take it regularly     Please advise    Pharmacy is [facility] and can be reached at [phone]  
Rx formatted in refill request. Closing encounter.   
No